# Patient Record
Sex: MALE | Race: WHITE | NOT HISPANIC OR LATINO
[De-identification: names, ages, dates, MRNs, and addresses within clinical notes are randomized per-mention and may not be internally consistent; named-entity substitution may affect disease eponyms.]

---

## 2017-01-12 ENCOUNTER — APPOINTMENT (OUTPATIENT)
Dept: FAMILY MEDICINE | Facility: CLINIC | Age: 82
End: 2017-01-12

## 2017-01-12 VITALS
TEMPERATURE: 97.8 F | BODY MASS INDEX: 29.1 KG/M2 | WEIGHT: 192 LBS | OXYGEN SATURATION: 96 % | DIASTOLIC BLOOD PRESSURE: 72 MMHG | HEIGHT: 68 IN | HEART RATE: 80 BPM | SYSTOLIC BLOOD PRESSURE: 130 MMHG | RESPIRATION RATE: 16 BRPM

## 2017-01-13 LAB
ALBUMIN SERPL ELPH-MCNC: 4.3 G/DL
ALP BLD-CCNC: 53 U/L
ALT SERPL-CCNC: 19 U/L
ANION GAP SERPL CALC-SCNC: 13 MMOL/L
AST SERPL-CCNC: 26 U/L
BASOPHILS # BLD AUTO: 0.02 K/UL
BASOPHILS NFR BLD AUTO: 0.2 %
BILIRUB SERPL-MCNC: 0.4 MG/DL
BUN SERPL-MCNC: 20 MG/DL
CALCIUM SERPL-MCNC: 9.9 MG/DL
CHLORIDE SERPL-SCNC: 102 MMOL/L
CHOLEST SERPL-MCNC: 161 MG/DL
CHOLEST/HDLC SERPL: 4.4 RATIO
CO2 SERPL-SCNC: 26 MMOL/L
CREAT SERPL-MCNC: 1.36 MG/DL
EOSINOPHIL # BLD AUTO: 0.61 K/UL
EOSINOPHIL NFR BLD AUTO: 7.4 %
GLUCOSE SERPL-MCNC: 155 MG/DL
HBA1C MFR BLD HPLC: 6.8 %
HCT VFR BLD CALC: 47.8 %
HDLC SERPL-MCNC: 37 MG/DL
HGB BLD-MCNC: 15.6 G/DL
IMM GRANULOCYTES NFR BLD AUTO: 0.4 %
LDLC SERPL CALC-MCNC: 69 MG/DL
LYMPHOCYTES # BLD AUTO: 1.73 K/UL
LYMPHOCYTES NFR BLD AUTO: 20.9 %
MAN DIFF?: NORMAL
MCHC RBC-ENTMCNC: 29.4 PG
MCHC RBC-ENTMCNC: 32.6 GM/DL
MCV RBC AUTO: 90.2 FL
MONOCYTES # BLD AUTO: 0.48 K/UL
MONOCYTES NFR BLD AUTO: 5.8 %
NEUTROPHILS # BLD AUTO: 5.41 K/UL
NEUTROPHILS NFR BLD AUTO: 65.3 %
PLATELET # BLD AUTO: 186 K/UL
POTASSIUM SERPL-SCNC: 4.3 MMOL/L
PROT SERPL-MCNC: 7.3 G/DL
RBC # BLD: 5.3 M/UL
RBC # FLD: 12.9 %
SODIUM SERPL-SCNC: 141 MMOL/L
T4 FREE SERPL-MCNC: 1.6 NG/DL
TRIGL SERPL-MCNC: 277 MG/DL
TSH SERPL-ACNC: 1.72 UIU/ML
WBC # FLD AUTO: 8.28 K/UL

## 2017-01-23 ENCOUNTER — OTHER (OUTPATIENT)
Age: 82
End: 2017-01-23

## 2017-01-23 DIAGNOSIS — R89.9 UNSPECIFIED ABNORMAL FINDING IN SPECIMENS FROM OTHER ORGANS, SYSTEMS AND TISSUES: ICD-10-CM

## 2017-01-30 ENCOUNTER — FORM ENCOUNTER (OUTPATIENT)
Age: 82
End: 2017-01-30

## 2017-01-31 ENCOUNTER — OUTPATIENT (OUTPATIENT)
Dept: OUTPATIENT SERVICES | Facility: HOSPITAL | Age: 82
LOS: 1 days | End: 2017-01-31
Payer: COMMERCIAL

## 2017-01-31 ENCOUNTER — APPOINTMENT (OUTPATIENT)
Dept: ULTRASOUND IMAGING | Facility: HOSPITAL | Age: 82
End: 2017-01-31

## 2017-01-31 DIAGNOSIS — N28.9 DISORDER OF KIDNEY AND URETER, UNSPECIFIED: ICD-10-CM

## 2017-01-31 PROCEDURE — 76775 US EXAM ABDO BACK WALL LIM: CPT

## 2017-03-14 ENCOUNTER — TRANSCRIPTION ENCOUNTER (OUTPATIENT)
Age: 82
End: 2017-03-14

## 2017-04-13 ENCOUNTER — APPOINTMENT (OUTPATIENT)
Dept: FAMILY MEDICINE | Facility: CLINIC | Age: 82
End: 2017-04-13

## 2017-04-13 VITALS
HEART RATE: 70 BPM | DIASTOLIC BLOOD PRESSURE: 78 MMHG | BODY MASS INDEX: 29.4 KG/M2 | HEIGHT: 68 IN | WEIGHT: 194 LBS | TEMPERATURE: 98 F | OXYGEN SATURATION: 92 % | SYSTOLIC BLOOD PRESSURE: 130 MMHG

## 2017-04-13 VITALS — RESPIRATION RATE: 16 BRPM

## 2017-04-13 DIAGNOSIS — R35.0 FREQUENCY OF MICTURITION: ICD-10-CM

## 2017-04-14 LAB
ALBUMIN SERPL ELPH-MCNC: 4.2 G/DL
ALP BLD-CCNC: 49 U/L
ALT SERPL-CCNC: 28 U/L
ANION GAP SERPL CALC-SCNC: 13 MMOL/L
AST SERPL-CCNC: 32 U/L
BASOPHILS # BLD AUTO: 0.03 K/UL
BASOPHILS NFR BLD AUTO: 0.4 %
BILIRUB SERPL-MCNC: 0.3 MG/DL
BUN SERPL-MCNC: 20 MG/DL
CALCIUM SERPL-MCNC: 9.5 MG/DL
CHLORIDE SERPL-SCNC: 102 MMOL/L
CHOLEST SERPL-MCNC: 170 MG/DL
CHOLEST/HDLC SERPL: 4.9 RATIO
CK SERPL-CCNC: 136 U/L
CO2 SERPL-SCNC: 26 MMOL/L
CREAT SERPL-MCNC: 1.27 MG/DL
EOSINOPHIL # BLD AUTO: 0.67 K/UL
EOSINOPHIL NFR BLD AUTO: 8.3 %
GLUCOSE SERPL-MCNC: 153 MG/DL
HBA1C MFR BLD HPLC: 7.2 %
HCT VFR BLD CALC: 47.3 %
HDLC SERPL-MCNC: 35 MG/DL
HGB BLD-MCNC: 15.5 G/DL
IMM GRANULOCYTES NFR BLD AUTO: 0.6 %
LDLC SERPL CALC-MCNC: 87 MG/DL
LYMPHOCYTES # BLD AUTO: 2.25 K/UL
LYMPHOCYTES NFR BLD AUTO: 27.9 %
MAN DIFF?: NORMAL
MCHC RBC-ENTMCNC: 29.1 PG
MCHC RBC-ENTMCNC: 32.8 GM/DL
MCV RBC AUTO: 88.7 FL
MONOCYTES # BLD AUTO: 0.45 K/UL
MONOCYTES NFR BLD AUTO: 5.6 %
NEUTROPHILS # BLD AUTO: 4.61 K/UL
NEUTROPHILS NFR BLD AUTO: 57.2 %
PLATELET # BLD AUTO: 190 K/UL
POTASSIUM SERPL-SCNC: 3.8 MMOL/L
PROT SERPL-MCNC: 7.4 G/DL
PSA SERPL-MCNC: 1.42 NG/ML
RBC # BLD: 5.33 M/UL
RBC # FLD: 12.8 %
SODIUM SERPL-SCNC: 141 MMOL/L
T4 FREE SERPL-MCNC: 1.6 NG/DL
TRIGL SERPL-MCNC: 242 MG/DL
TSH SERPL-ACNC: 1.06 UIU/ML
WBC # FLD AUTO: 8.06 K/UL

## 2017-05-23 ENCOUNTER — APPOINTMENT (OUTPATIENT)
Dept: CARDIOLOGY | Facility: CLINIC | Age: 82
End: 2017-05-23

## 2017-05-23 ENCOUNTER — NON-APPOINTMENT (OUTPATIENT)
Age: 82
End: 2017-05-23

## 2017-05-23 VITALS
HEIGHT: 68 IN | OXYGEN SATURATION: 94 % | DIASTOLIC BLOOD PRESSURE: 73 MMHG | WEIGHT: 198 LBS | HEART RATE: 58 BPM | BODY MASS INDEX: 30.01 KG/M2 | SYSTOLIC BLOOD PRESSURE: 136 MMHG | RESPIRATION RATE: 17 BRPM

## 2017-05-23 DIAGNOSIS — I49.9 CARDIAC ARRHYTHMIA, UNSPECIFIED: ICD-10-CM

## 2017-07-11 ENCOUNTER — RX RENEWAL (OUTPATIENT)
Age: 82
End: 2017-07-11

## 2017-07-12 ENCOUNTER — RX RENEWAL (OUTPATIENT)
Age: 82
End: 2017-07-12

## 2017-08-24 ENCOUNTER — APPOINTMENT (OUTPATIENT)
Dept: FAMILY MEDICINE | Facility: CLINIC | Age: 82
End: 2017-08-24
Payer: MEDICARE

## 2017-08-24 VITALS
RESPIRATION RATE: 16 BRPM | SYSTOLIC BLOOD PRESSURE: 140 MMHG | DIASTOLIC BLOOD PRESSURE: 70 MMHG | TEMPERATURE: 97.8 F | WEIGHT: 194 LBS | HEIGHT: 68 IN | HEART RATE: 60 BPM | OXYGEN SATURATION: 96 % | BODY MASS INDEX: 29.4 KG/M2

## 2017-08-24 PROCEDURE — 99214 OFFICE O/P EST MOD 30 MIN: CPT

## 2017-08-28 LAB
ALBUMIN SERPL ELPH-MCNC: 3.8 G/DL
ALP BLD-CCNC: 44 U/L
ALT SERPL-CCNC: 19 U/L
ANION GAP SERPL CALC-SCNC: 14 MMOL/L
APPEARANCE: CLEAR
AST SERPL-CCNC: 26 U/L
B BURGDOR AB SER-IMP: NEGATIVE
B BURGDOR IGM PATRN SER IB-IMP: NEGATIVE
B BURGDOR18/20KD IGM SER QL IB: NORMAL
B BURGDOR18KD IGG SER QL IB: PRESENT
B BURGDOR23KD IGG SER QL IB: NORMAL
B BURGDOR23KD IGM SER QL IB: NORMAL
B BURGDOR28KD AB SER QL IB: NORMAL
B BURGDOR28KD IGG SER QL IB: NORMAL
B BURGDOR30KD AB SER QL IB: NORMAL
B BURGDOR30KD IGG SER QL IB: NORMAL
B BURGDOR31KD IGG SER QL IB: NORMAL
B BURGDOR31KD IGM SER QL IB: NORMAL
B BURGDOR39KD IGG SER QL IB: NORMAL
B BURGDOR39KD IGM SER QL IB: PRESENT
B BURGDOR41KD IGG SER QL IB: NORMAL
B BURGDOR41KD IGM SER QL IB: NORMAL
B BURGDOR45KD AB SER QL IB: NORMAL
B BURGDOR45KD IGG SER QL IB: NORMAL
B BURGDOR58KD AB SER QL IB: NORMAL
B BURGDOR58KD IGG SER QL IB: NORMAL
B BURGDOR66KD IGG SER QL IB: NORMAL
B BURGDOR66KD IGM SER QL IB: NORMAL
B BURGDOR93KD IGG SER QL IB: NORMAL
B BURGDOR93KD IGM SER QL IB: NORMAL
BACTERIA: NEGATIVE
BASOPHILS # BLD AUTO: 0.04 K/UL
BASOPHILS NFR BLD AUTO: 0.5 %
BILIRUB SERPL-MCNC: 0.4 MG/DL
BILIRUBIN URINE: NEGATIVE
BLOOD URINE: NEGATIVE
BUN SERPL-MCNC: 23 MG/DL
CALCIUM SERPL-MCNC: 9.5 MG/DL
CHLORIDE SERPL-SCNC: 103 MMOL/L
CHOLEST SERPL-MCNC: 141 MG/DL
CHOLEST/HDLC SERPL: 4.4 RATIO
CK SERPL-CCNC: 90 U/L
CO2 SERPL-SCNC: 25 MMOL/L
COLOR: YELLOW
CREAT SERPL-MCNC: 1.39 MG/DL
CREAT SPEC-SCNC: 114 MG/DL
EOSINOPHIL # BLD AUTO: 0.94 K/UL
EOSINOPHIL NFR BLD AUTO: 11.8 %
GLUCOSE QUALITATIVE U: NORMAL MG/DL
GLUCOSE SERPL-MCNC: 132 MG/DL
HBA1C MFR BLD HPLC: 6.7 %
HCT VFR BLD CALC: 46.4 %
HDLC SERPL-MCNC: 32 MG/DL
HGB BLD-MCNC: 14.8 G/DL
IMM GRANULOCYTES NFR BLD AUTO: 0.6 %
KETONES URINE: NEGATIVE
LDLC SERPL CALC-MCNC: 82 MG/DL
LEUKOCYTE ESTERASE URINE: NEGATIVE
LYMPHOCYTES # BLD AUTO: 1.98 K/UL
LYMPHOCYTES NFR BLD AUTO: 24.8 %
MAN DIFF?: NORMAL
MCHC RBC-ENTMCNC: 28.2 PG
MCHC RBC-ENTMCNC: 31.9 GM/DL
MCV RBC AUTO: 88.5 FL
MICROALBUMIN 24H UR DL<=1MG/L-MCNC: <0.3 MG/DL
MICROALBUMIN/CREAT 24H UR-RTO: NORMAL
MICROSCOPIC-UA: NORMAL
MONOCYTES # BLD AUTO: 0.61 K/UL
MONOCYTES NFR BLD AUTO: 7.7 %
NEUTROPHILS # BLD AUTO: 4.35 K/UL
NEUTROPHILS NFR BLD AUTO: 54.6 %
NITRITE URINE: NEGATIVE
PH URINE: 5.5
PLATELET # BLD AUTO: 204 K/UL
POTASSIUM SERPL-SCNC: 4.1 MMOL/L
PROT SERPL-MCNC: 7 G/DL
PROTEIN URINE: NEGATIVE MG/DL
RBC # BLD: 5.24 M/UL
RBC # FLD: 13 %
RED BLOOD CELLS URINE: 1 /HPF
SODIUM SERPL-SCNC: 142 MMOL/L
SPECIFIC GRAVITY URINE: 1.02
SQUAMOUS EPITHELIAL CELLS: 0 /HPF
T4 FREE SERPL-MCNC: 1.4 NG/DL
TRIGL SERPL-MCNC: 135 MG/DL
TSH SERPL-ACNC: 3.67 UIU/ML
UROBILINOGEN URINE: NORMAL MG/DL
WBC # FLD AUTO: 7.97 K/UL
WHITE BLOOD CELLS URINE: 1 /HPF

## 2017-08-30 ENCOUNTER — RX RENEWAL (OUTPATIENT)
Age: 82
End: 2017-08-30

## 2017-10-19 ENCOUNTER — MEDICATION RENEWAL (OUTPATIENT)
Age: 82
End: 2017-10-19

## 2017-10-30 ENCOUNTER — APPOINTMENT (OUTPATIENT)
Dept: FAMILY MEDICINE | Facility: CLINIC | Age: 82
End: 2017-10-30

## 2017-11-15 ENCOUNTER — APPOINTMENT (OUTPATIENT)
Dept: FAMILY MEDICINE | Facility: CLINIC | Age: 82
End: 2017-11-15
Payer: MEDICARE

## 2017-11-15 ENCOUNTER — OTHER (OUTPATIENT)
Age: 82
End: 2017-11-15

## 2017-11-15 DIAGNOSIS — H91.90 UNSPECIFIED HEARING LOSS, UNSPECIFIED EAR: ICD-10-CM

## 2017-11-15 PROCEDURE — 90688 IIV4 VACCINE SPLT 0.5 ML IM: CPT

## 2017-11-15 PROCEDURE — G0008: CPT

## 2017-11-28 ENCOUNTER — APPOINTMENT (OUTPATIENT)
Dept: CARDIOLOGY | Facility: CLINIC | Age: 82
End: 2017-11-28
Payer: MEDICARE

## 2017-11-28 ENCOUNTER — NON-APPOINTMENT (OUTPATIENT)
Age: 82
End: 2017-11-28

## 2017-11-28 VITALS
WEIGHT: 196 LBS | BODY MASS INDEX: 29.7 KG/M2 | SYSTOLIC BLOOD PRESSURE: 140 MMHG | HEART RATE: 61 BPM | DIASTOLIC BLOOD PRESSURE: 83 MMHG | OXYGEN SATURATION: 95 % | RESPIRATION RATE: 16 BRPM | HEIGHT: 68 IN

## 2017-11-28 PROCEDURE — 99214 OFFICE O/P EST MOD 30 MIN: CPT

## 2017-11-28 PROCEDURE — 93000 ELECTROCARDIOGRAM COMPLETE: CPT

## 2018-02-01 ENCOUNTER — RX RENEWAL (OUTPATIENT)
Age: 83
End: 2018-02-01

## 2018-02-01 RX ORDER — MELOXICAM 15 MG/1
15 TABLET ORAL DAILY
Qty: 90 | Refills: 1 | Status: ACTIVE | COMMUNITY
Start: 2017-08-24 | End: 1900-01-01

## 2018-04-22 ENCOUNTER — RX RENEWAL (OUTPATIENT)
Age: 83
End: 2018-04-22

## 2018-05-15 ENCOUNTER — APPOINTMENT (OUTPATIENT)
Dept: CARDIOLOGY | Facility: CLINIC | Age: 83
End: 2018-05-15
Payer: MEDICARE

## 2018-05-15 ENCOUNTER — NON-APPOINTMENT (OUTPATIENT)
Age: 83
End: 2018-05-15

## 2018-05-15 VITALS
RESPIRATION RATE: 17 BRPM | BODY MASS INDEX: 30.62 KG/M2 | HEART RATE: 76 BPM | DIASTOLIC BLOOD PRESSURE: 80 MMHG | HEIGHT: 68 IN | SYSTOLIC BLOOD PRESSURE: 147 MMHG | OXYGEN SATURATION: 93 % | WEIGHT: 202 LBS

## 2018-05-15 DIAGNOSIS — R06.09 OTHER FORMS OF DYSPNEA: ICD-10-CM

## 2018-05-15 PROCEDURE — 93306 TTE W/DOPPLER COMPLETE: CPT

## 2018-05-15 PROCEDURE — 93000 ELECTROCARDIOGRAM COMPLETE: CPT

## 2018-05-15 PROCEDURE — 99215 OFFICE O/P EST HI 40 MIN: CPT

## 2018-05-16 ENCOUNTER — APPOINTMENT (OUTPATIENT)
Dept: FAMILY MEDICINE | Facility: CLINIC | Age: 83
End: 2018-05-16
Payer: MEDICARE

## 2018-05-16 ENCOUNTER — FORM ENCOUNTER (OUTPATIENT)
Age: 83
End: 2018-05-16

## 2018-05-16 VITALS
BODY MASS INDEX: 30.76 KG/M2 | OXYGEN SATURATION: 92 % | SYSTOLIC BLOOD PRESSURE: 128 MMHG | WEIGHT: 196 LBS | HEART RATE: 79 BPM | HEIGHT: 67 IN | DIASTOLIC BLOOD PRESSURE: 80 MMHG | TEMPERATURE: 98 F

## 2018-05-16 DIAGNOSIS — R26.9 UNSPECIFIED ABNORMALITIES OF GAIT AND MOBILITY: ICD-10-CM

## 2018-05-16 PROCEDURE — 36415 COLL VENOUS BLD VENIPUNCTURE: CPT

## 2018-05-16 PROCEDURE — G0439: CPT

## 2018-05-16 NOTE — HEALTH RISK ASSESSMENT
[Good] : ~his/her~  mood as  good [Two or more falls in past year] : Patient reported two or more falls in the past year [0] : 2) Feeling down, depressed, or hopeless: Not at all (0) [Patient refused colonoscopy] : Patient refused colonoscopy [Reviewed and Updated] :  reviewed and updated [None] : None [With Significant Other] : lives with significant other [Retired] : retired [High School] : high school [] :  [Feels Safe at Home] : Feels safe at home [Fully functional (bathing, dressing, toileting, transferring, walking, feeding)] : Fully functional (bathing, dressing, toileting, transferring, walking, feeding) [Fully functional (using the telephone, shopping, preparing meals, housekeeping, doing laundry, using] : Fully functional and needs no help or supervision to perform IADLs (using the telephone, shopping, preparing meals, housekeeping, doing laundry, using transportation, managing medications and managing finances) [Reports changes in hearing] : Reports changes in hearing [Smoke Detector] : smoke detector [Carbon Monoxide Detector] : carbon monoxide detector [Safety elements used in home] : safety elements used in home [Seat Belt] :  uses seat belt [Sunscreen] : uses sunscreen [Discussed at today's visit] : Advance Directives Discussed at today's visit [Patient's preferences updated] : Patient's preferences updated  [Designated Healthcare Proxy] : Designated healthcare proxy [Relationship: ___] : Relationship: [unfilled] [Aggressive treatment] : aggressive treatment [] : No [de-identified] : An occasional glass of wine [[id number]] : 0 [Change in mental status noted] : No change in mental status noted [Language] : denies difficulty with language [Behavior] : denies difficulty with behavior [Learning/Retaining New Information] : denies difficulty learning/retaining new information [Handling Complex Tasks] : denies difficulty handling complex tasks [Reasoning] : denies difficulty with reasoning [Spatial Ability and Orientation] : denies difficulty with spatial ability and orientation [Sexually Active] : not sexually active [High Risk Behavior] : no high risk behavior [Reports changes in vision] : Reports no changes in vision [Reports changes in dental health] : Reports no changes in dental health [Guns at Home] : no guns at home [Travel to Developing Areas] : does not  travel to developing areas [TB Exposure] : is not being exposed to tuberculosis [Caregiver Concerns] : does not have caregiver concerns [de-identified] : [place], Hearing aid

## 2018-05-16 NOTE — REVIEW OF SYSTEMS
[Hearing Loss] : hearing loss [Dyspnea on Exertion] : dyspnea on exertion [Negative] : Heme/Lymph [Earache] : no earache [Nosebleeds] : no nosebleeds [Hoarseness] : no hoarseness [Nasal Discharge] : no nasal discharge [Sore Throat] : no sore throat [Shortness Of Breath] : no shortness of breath [Wheezing] : no wheezing [Cough] : no cough [Headache] : no headache [Dizziness] : no dizziness [Fainting] : no fainting [Confusion] : no confusion [Memory Loss] : no memory loss

## 2018-05-16 NOTE — HISTORY OF PRESENT ILLNESS
[FreeTextEntry1] : Please see history of present illness [de-identified] : Patient is an 83-year-old gentleman, who presents today for followup. Health maintenance, physical exam patient states that he is in his usual state of health. Multiple medical problems, which include hypothyroidism, ischemic heart disease, followed closely by cardiology, hyperlipidemia, and hypertension. Patient also has a history of peripheral arterial disease. Chief complaint patient complains of occasional unsteady gait, especially with change in position while standing quickly. It doesn't happen while he is walking. It doesn't happen at rest. It only happens with quick change in position in a standing position. He states that his knees feel like they buckle .

## 2018-05-16 NOTE — ASSESSMENT
[FreeTextEntry1] : Continue present medical management, comprehensive blood work obtained, fecal occult blood. Immunology ordered. Electrocardiogram was done by cardiology yesterday. Recommend physical therapy for gait training. Neurologically, patient was 100% intact, except, of course for hearing loss

## 2018-05-16 NOTE — COUNSELING
[Weight management counseling provided] : Weight management [Healthy eating counseling provided] : healthy eating [Activity counseling provided] : activity [Behavioral health counseling provided] : behavioral health  [Sleep ___ hours/day] : Sleep [unfilled] hours/day [Engage in a relaxing activity] : Engage in a relaxing activity [Plan in advance] : Plan in advance [None] : None [Good understanding] : Patient has a good understanding of lifestyle changes and the steps needed to achieve self management goals

## 2018-05-16 NOTE — PHYSICAL EXAM
[No Acute Distress] : no acute distress [Well Nourished] : well nourished [Well Developed] : well developed [Well-Appearing] : well-appearing [Normal Sclera/Conjunctiva] : normal sclera/conjunctiva [PERRL] : pupils equal round and reactive to light [EOMI] : extraocular movements intact [Normal Outer Ear/Nose] : the outer ears and nose were normal in appearance [Normal Oropharynx] : the oropharynx was normal [No JVD] : no jugular venous distention [Supple] : supple [No Lymphadenopathy] : no lymphadenopathy [Thyroid Normal, No Nodules] : the thyroid was normal and there were no nodules present [No Respiratory Distress] : no respiratory distress  [Clear to Auscultation] : lungs were clear to auscultation bilaterally [No Accessory Muscle Use] : no accessory muscle use [Normal Rate] : normal rate  [Regular Rhythm] : with a regular rhythm [Normal S1, S2] : normal S1 and S2 [No Murmur] : no murmur heard [No Carotid Bruits] : no carotid bruits [No Abdominal Bruit] : a ~M bruit was not heard ~T in the abdomen [No Varicosities] : no varicosities [Pedal Pulses Present] : the pedal pulses are present [No Edema] : there was no peripheral edema [No Extremity Clubbing/Cyanosis] : no extremity clubbing/cyanosis [No Palpable Aorta] : no palpable aorta [Soft] : abdomen soft [Non Tender] : non-tender [Non-distended] : non-distended [No HSM] : no HSM [Normal Bowel Sounds] : normal bowel sounds [Normal Posterior Cervical Nodes] : no posterior cervical lymphadenopathy [Normal Anterior Cervical Nodes] : no anterior cervical lymphadenopathy [No CVA Tenderness] : no CVA  tenderness [No Spinal Tenderness] : no spinal tenderness [No Joint Swelling] : no joint swelling [Grossly Normal Strength/Tone] : grossly normal strength/tone [No Rash] : no rash [Normal Gait] : normal gait [Coordination Grossly Intact] : coordination grossly intact [No Focal Deficits] : no focal deficits [Deep Tendon Reflexes (DTR)] : deep tendon reflexes were 2+ and symmetric [Normal Affect] : the affect was normal [Normal Insight/Judgement] : insight and judgment were intact [Normal Voice/Communication] : normal voice/communication [Normal Appearance] : normal in appearance [No Masses] : no palpable masses [No Nipple Discharge] : no nipple discharge [No Axillary Lymphadenopathy] : no axillary lymphadenopathy [No Hernias] : no hernias [No Stool to Guaiac] : no stool to guaiac [Normal Sphincter Tone] : normal sphincter tone [No Mass] : no mass [Urethral Meatus] : meatus normal [Penis Abnormality] : normal circumcised penis [Urinary Bladder Findings] : the bladder was normal on palpation [Scrotum] : the scrotum was normal [Rectal Exam - Seminal Vesicles] : the seminal vesicles were normal [Epididymis] : the epididymides were normal [Testes Tenderness] : no tenderness of the testes [Testes Mass (___cm)] : there were no testicular masses [Anus Abnormality] : the anus and perineum were normal [Rectal Exam - Rectum] : digital rectal exam was normal [Prostate Tenderness] : the prostate was not tender [Normal Supraclavicular Nodes] : no supraclavicular lymphadenopathy [Normal Axillary Nodes] : no axillary lymphadenopathy [Normal Inguinal Nodes] : no inguinal lymphadenopathy [No Skin Lesions] : no skin lesions [Speech Grossly Normal] : speech grossly normal [Memory Grossly Normal] : memory grossly normal [Alert and Oriented x3] : oriented to person, place, and time [Normal Mood] : the mood was normal [Comprehensive Foot Exam Normal] : Right and left foot were examined and both feet are normal. No ulcers in either foot. Toes are normal and with full ROM. [Kyphosis] : no kyphosis [Scoliosis] : no scoliosis [de-identified] : [place] Hearing aid in place. Right ear [FreeTextEntry1] : Prostate enlarged

## 2018-05-17 ENCOUNTER — OUTPATIENT (OUTPATIENT)
Dept: OUTPATIENT SERVICES | Facility: HOSPITAL | Age: 83
LOS: 1 days | End: 2018-05-17
Payer: COMMERCIAL

## 2018-05-17 ENCOUNTER — APPOINTMENT (OUTPATIENT)
Dept: RADIOLOGY | Facility: HOSPITAL | Age: 83
End: 2018-05-17
Payer: MEDICARE

## 2018-05-17 DIAGNOSIS — Z13.820 ENCOUNTER FOR SCREENING FOR OSTEOPOROSIS: ICD-10-CM

## 2018-05-17 DIAGNOSIS — I49.9 CARDIAC ARRHYTHMIA, UNSPECIFIED: ICD-10-CM

## 2018-05-17 DIAGNOSIS — M85.832 OTHER SPECIFIED DISORDERS OF BONE DENSITY AND STRUCTURE, LEFT FOREARM: ICD-10-CM

## 2018-05-17 LAB
ALBUMIN SERPL ELPH-MCNC: 4.6 G/DL
ALP BLD-CCNC: 46 U/L
ALT SERPL-CCNC: 45 U/L
ANION GAP SERPL CALC-SCNC: 14 MMOL/L
APPEARANCE: CLEAR
AST SERPL-CCNC: 48 U/L
BACTERIA: NEGATIVE
BASOPHILS # BLD AUTO: 0.03 K/UL
BASOPHILS NFR BLD AUTO: 0.4 %
BILIRUB SERPL-MCNC: 0.8 MG/DL
BILIRUBIN URINE: NEGATIVE
BLOOD URINE: NEGATIVE
BUN SERPL-MCNC: 21 MG/DL
CALCIUM SERPL-MCNC: 10.2 MG/DL
CHLORIDE SERPL-SCNC: 102 MMOL/L
CHOLEST SERPL-MCNC: 194 MG/DL
CHOLEST/HDLC SERPL: 5 RATIO
CK SERPL-CCNC: 110 U/L
CO2 SERPL-SCNC: 25 MMOL/L
COLOR: YELLOW
CREAT SERPL-MCNC: 1.46 MG/DL
CREAT SPEC-SCNC: 123 MG/DL
EOSINOPHIL # BLD AUTO: 0.8 K/UL
EOSINOPHIL NFR BLD AUTO: 9.5 %
GLUCOSE QUALITATIVE U: NEGATIVE MG/DL
GLUCOSE SERPL-MCNC: 141 MG/DL
HBA1C MFR BLD HPLC: 7.1 %
HCT VFR BLD CALC: 51.2 %
HDLC SERPL-MCNC: 39 MG/DL
HGB BLD-MCNC: 16.9 G/DL
HYALINE CASTS: 0 /LPF
IMM GRANULOCYTES NFR BLD AUTO: 0.6 %
KETONES URINE: NEGATIVE
LDLC SERPL CALC-MCNC: 101 MG/DL
LEUKOCYTE ESTERASE URINE: NEGATIVE
LYMPHOCYTES # BLD AUTO: 2.14 K/UL
LYMPHOCYTES NFR BLD AUTO: 25.5 %
MAN DIFF?: NORMAL
MCHC RBC-ENTMCNC: 29.8 PG
MCHC RBC-ENTMCNC: 33 GM/DL
MCV RBC AUTO: 90.1 FL
MICROALBUMIN 24H UR DL<=1MG/L-MCNC: 3 MG/DL
MICROALBUMIN/CREAT 24H UR-RTO: 24 MG/G
MICROSCOPIC-UA: NORMAL
MONOCYTES # BLD AUTO: 0.67 K/UL
MONOCYTES NFR BLD AUTO: 8 %
NEUTROPHILS # BLD AUTO: 4.69 K/UL
NEUTROPHILS NFR BLD AUTO: 56 %
NITRITE URINE: NEGATIVE
PH URINE: 5
PLATELET # BLD AUTO: 197 K/UL
POTASSIUM SERPL-SCNC: 4.1 MMOL/L
PROT SERPL-MCNC: 7.7 G/DL
PROTEIN URINE: NEGATIVE MG/DL
PSA SERPL-MCNC: 1.52 NG/ML
RBC # BLD: 5.68 M/UL
RBC # FLD: 13 %
RED BLOOD CELLS URINE: 0 /HPF
SODIUM SERPL-SCNC: 141 MMOL/L
SPECIFIC GRAVITY URINE: 1.02
SQUAMOUS EPITHELIAL CELLS: 0 /HPF
T4 FREE SERPL-MCNC: 1.6 NG/DL
TRIGL SERPL-MCNC: 268 MG/DL
TSH SERPL-ACNC: 5.69 UIU/ML
UROBILINOGEN URINE: NEGATIVE MG/DL
WBC # FLD AUTO: 8.38 K/UL
WHITE BLOOD CELLS URINE: 1 /HPF

## 2018-05-17 PROCEDURE — 77080 DXA BONE DENSITY AXIAL: CPT | Mod: 26

## 2018-05-17 PROCEDURE — 77080 DXA BONE DENSITY AXIAL: CPT

## 2018-05-22 LAB — HEMOCCULT STL QL IA: NEGATIVE

## 2018-06-17 ENCOUNTER — EMERGENCY (EMERGENCY)
Facility: HOSPITAL | Age: 83
LOS: 1 days | Discharge: HOME | End: 2018-06-17
Attending: EMERGENCY MEDICINE | Admitting: INTERNAL MEDICINE
Payer: COMMERCIAL

## 2018-06-17 VITALS
HEIGHT: 67 IN | OXYGEN SATURATION: 94 % | RESPIRATION RATE: 17 BRPM | HEART RATE: 86 BPM | TEMPERATURE: 97 F | SYSTOLIC BLOOD PRESSURE: 118 MMHG | DIASTOLIC BLOOD PRESSURE: 82 MMHG | WEIGHT: 195.11 LBS

## 2018-06-17 DIAGNOSIS — R33.9 RETENTION OF URINE, UNSPECIFIED: ICD-10-CM

## 2018-06-17 LAB
ALBUMIN SERPL ELPH-MCNC: 2.7 G/DL — LOW (ref 3.3–5)
ALP SERPL-CCNC: 49 U/L — SIGNIFICANT CHANGE UP (ref 40–120)
ALT FLD-CCNC: 26 U/L DA — SIGNIFICANT CHANGE UP (ref 10–45)
ANION GAP SERPL CALC-SCNC: 7 MMOL/L — SIGNIFICANT CHANGE UP (ref 5–17)
APPEARANCE UR: CLEAR — SIGNIFICANT CHANGE UP
AST SERPL-CCNC: 21 U/L — SIGNIFICANT CHANGE UP (ref 10–40)
BACTERIA # UR AUTO: ABNORMAL /HPF
BASOPHILS # BLD AUTO: 0.1 K/UL — SIGNIFICANT CHANGE UP (ref 0–0.2)
BASOPHILS NFR BLD AUTO: 0.7 % — SIGNIFICANT CHANGE UP (ref 0–2)
BILIRUB SERPL-MCNC: 0.4 MG/DL — SIGNIFICANT CHANGE UP (ref 0.2–1.2)
BILIRUB UR-MCNC: NEGATIVE — SIGNIFICANT CHANGE UP
BUN SERPL-MCNC: 20 MG/DL — SIGNIFICANT CHANGE UP (ref 7–23)
CALCIUM SERPL-MCNC: 8.6 MG/DL — SIGNIFICANT CHANGE UP (ref 8.4–10.5)
CHLORIDE SERPL-SCNC: 103 MMOL/L — SIGNIFICANT CHANGE UP (ref 96–108)
CO2 SERPL-SCNC: 25 MMOL/L — SIGNIFICANT CHANGE UP (ref 22–31)
COLOR SPEC: YELLOW — SIGNIFICANT CHANGE UP
CREAT SERPL-MCNC: 1.42 MG/DL — HIGH (ref 0.5–1.3)
DIFF PNL FLD: ABNORMAL
EOSINOPHIL # BLD AUTO: 0.4 K/UL — SIGNIFICANT CHANGE UP (ref 0–0.5)
EOSINOPHIL NFR BLD AUTO: 2.8 % — SIGNIFICANT CHANGE UP (ref 0–6)
EPI CELLS # UR: SIGNIFICANT CHANGE UP
GLUCOSE SERPL-MCNC: 129 MG/DL — HIGH (ref 70–99)
GLUCOSE UR QL: NEGATIVE — SIGNIFICANT CHANGE UP
HCT VFR BLD CALC: 43.2 % — SIGNIFICANT CHANGE UP (ref 39–50)
HGB BLD-MCNC: 14.9 G/DL — SIGNIFICANT CHANGE UP (ref 13–17)
KETONES UR-MCNC: NEGATIVE — SIGNIFICANT CHANGE UP
LEUKOCYTE ESTERASE UR-ACNC: ABNORMAL
LYMPHOCYTES # BLD AUTO: 1.5 K/UL — SIGNIFICANT CHANGE UP (ref 1–3.3)
LYMPHOCYTES # BLD AUTO: 11.6 % — LOW (ref 13–44)
MCHC RBC-ENTMCNC: 29.7 PG — SIGNIFICANT CHANGE UP (ref 27–34)
MCHC RBC-ENTMCNC: 34.4 GM/DL — SIGNIFICANT CHANGE UP (ref 32–36)
MCV RBC AUTO: 86.2 FL — SIGNIFICANT CHANGE UP (ref 80–100)
MONOCYTES # BLD AUTO: 0.9 K/UL — SIGNIFICANT CHANGE UP (ref 0–0.9)
MONOCYTES NFR BLD AUTO: 7.4 % — SIGNIFICANT CHANGE UP (ref 2–14)
NEUTROPHILS # BLD AUTO: 9.8 K/UL — HIGH (ref 1.8–7.4)
NEUTROPHILS NFR BLD AUTO: 77.4 % — HIGH (ref 43–77)
NITRITE UR-MCNC: NEGATIVE — SIGNIFICANT CHANGE UP
PH UR: 5 — SIGNIFICANT CHANGE UP (ref 5–8)
PLATELET # BLD AUTO: 266 K/UL — SIGNIFICANT CHANGE UP (ref 150–400)
POTASSIUM SERPL-MCNC: 3.9 MMOL/L — SIGNIFICANT CHANGE UP (ref 3.5–5.3)
POTASSIUM SERPL-SCNC: 3.9 MMOL/L — SIGNIFICANT CHANGE UP (ref 3.5–5.3)
PROT SERPL-MCNC: 7 G/DL — SIGNIFICANT CHANGE UP (ref 6–8.3)
PROT UR-MCNC: 30 MG/DL
RBC # BLD: 5.01 M/UL — SIGNIFICANT CHANGE UP (ref 4.2–5.8)
RBC # FLD: 11.5 % — SIGNIFICANT CHANGE UP (ref 10.3–14.5)
RBC CASTS # UR COMP ASSIST: NEGATIVE /HPF — SIGNIFICANT CHANGE UP (ref 0–4)
SODIUM SERPL-SCNC: 135 MMOL/L — SIGNIFICANT CHANGE UP (ref 135–145)
SP GR SPEC: 1.02 — SIGNIFICANT CHANGE UP (ref 1.01–1.02)
UROBILINOGEN FLD QL: NEGATIVE — SIGNIFICANT CHANGE UP
WBC # BLD: 12.7 K/UL — HIGH (ref 3.8–10.5)
WBC # FLD AUTO: 12.7 K/UL — HIGH (ref 3.8–10.5)
WBC UR QL: SIGNIFICANT CHANGE UP /HPF (ref 0–5)

## 2018-06-17 PROCEDURE — 71046 X-RAY EXAM CHEST 2 VIEWS: CPT | Mod: 26

## 2018-06-17 PROCEDURE — 85027 COMPLETE CBC AUTOMATED: CPT

## 2018-06-17 PROCEDURE — 71046 X-RAY EXAM CHEST 2 VIEWS: CPT

## 2018-06-17 PROCEDURE — 80053 COMPREHEN METABOLIC PANEL: CPT

## 2018-06-17 PROCEDURE — 99284 EMERGENCY DEPT VISIT MOD MDM: CPT | Mod: 25

## 2018-06-17 PROCEDURE — 74176 CT ABD & PELVIS W/O CONTRAST: CPT

## 2018-06-17 PROCEDURE — 87086 URINE CULTURE/COLONY COUNT: CPT

## 2018-06-17 PROCEDURE — 99284 EMERGENCY DEPT VISIT MOD MDM: CPT

## 2018-06-17 PROCEDURE — 81001 URINALYSIS AUTO W/SCOPE: CPT

## 2018-06-17 PROCEDURE — 74176 CT ABD & PELVIS W/O CONTRAST: CPT | Mod: 26

## 2018-06-17 RX ORDER — GLIMEPIRIDE 1 MG
1 TABLET ORAL
Qty: 0 | Refills: 0 | COMMUNITY

## 2018-06-17 RX ORDER — MELOXICAM 15 MG/1
1 TABLET ORAL
Qty: 0 | Refills: 0 | COMMUNITY

## 2018-06-17 RX ORDER — CIPROFLOXACIN LACTATE 400MG/40ML
1 VIAL (ML) INTRAVENOUS
Qty: 14 | Refills: 0 | OUTPATIENT
Start: 2018-06-17 | End: 2018-06-23

## 2018-06-17 NOTE — ED PROVIDER NOTE - OBJECTIVE STATEMENT
83 yr old male with hx of DM, hypothyroid, CAD, HTN, HLD presents c/o productive cough for the last 3 weeks, and dysuria, urinary urgency, frequency for the last 2 days. Pt also admits to lower back pain. Denies any fever, chills, abdominal pain, chest pain, n/v/d or any other symptoms at this time.

## 2018-06-17 NOTE — ED PROVIDER NOTE - MEDICAL DECISION MAKING DETAILS
83 yr old male with hx of DM, hypothyroid, CAD, HTN, HLD presents c/o productive cough for the last 3 weeks, and dysuria, urinary urgency, frequency for the last 2 days. Pt also admits to lower back pain. : cbc, cmp, ua, uc, cxray- re eval Courtney: 83 yr old male with hx of DM, hypothyroid, CAD, HTN, HLD presents c/o productive cough for the last 3 weeks, and dysuria, urinary urgency, frequency for the last 2 days. Pt also admits to lower back pain. : cbc, cmp, ua, uc, cxray- re eval

## 2018-06-17 NOTE — ED ADULT NURSE NOTE - OBJECTIVE STATEMENT
pt presents to ED with c/o pain during urination that began friday with associated decreased urination as well.

## 2018-06-17 NOTE — ED PROVIDER NOTE - PROGRESS NOTE DETAILS
Labs and ct reviewed Will treat with cipro for prostatitis. Pt stable for discharge and to follow up with urology outpt. Pt agrees with plan.

## 2018-06-17 NOTE — ED PROVIDER NOTE - PMH
Colitis  10/2013  Diverticulitis    Gastric ulcer    GERD (gastroesophageal reflux disease)    HLD (hyperlipidemia)    HTN (hypertension)    Hypothyroidism    Renal calculi    Renal insufficiency

## 2018-06-18 LAB
CULTURE RESULTS: SIGNIFICANT CHANGE UP
SPECIMEN SOURCE: SIGNIFICANT CHANGE UP

## 2018-06-18 RX ORDER — CIPROFLOXACIN LACTATE 400MG/40ML
500 VIAL (ML) INTRAVENOUS ONCE
Qty: 0 | Refills: 0 | Status: DISCONTINUED | OUTPATIENT
Start: 2018-06-18 | End: 2018-06-18

## 2018-06-19 ENCOUNTER — APPOINTMENT (OUTPATIENT)
Dept: CARDIOLOGY | Facility: CLINIC | Age: 83
End: 2018-06-19
Payer: MEDICARE

## 2018-06-19 PROCEDURE — 93015 CV STRESS TEST SUPVJ I&R: CPT

## 2018-06-19 PROCEDURE — 78452 HT MUSCLE IMAGE SPECT MULT: CPT

## 2018-06-19 PROCEDURE — A9500: CPT

## 2018-07-09 ENCOUNTER — FORM ENCOUNTER (OUTPATIENT)
Age: 83
End: 2018-07-09

## 2018-07-09 ENCOUNTER — APPOINTMENT (OUTPATIENT)
Dept: FAMILY MEDICINE | Facility: CLINIC | Age: 83
End: 2018-07-09
Payer: MEDICARE

## 2018-07-09 VITALS
WEIGHT: 198 LBS | HEART RATE: 74 BPM | SYSTOLIC BLOOD PRESSURE: 130 MMHG | TEMPERATURE: 98 F | BODY MASS INDEX: 32.99 KG/M2 | HEIGHT: 65 IN | DIASTOLIC BLOOD PRESSURE: 70 MMHG | OXYGEN SATURATION: 95 %

## 2018-07-09 VITALS — RESPIRATION RATE: 16 BRPM

## 2018-07-09 DIAGNOSIS — N39.0 URINARY TRACT INFECTION, SITE NOT SPECIFIED: ICD-10-CM

## 2018-07-09 DIAGNOSIS — M54.5 LOW BACK PAIN: ICD-10-CM

## 2018-07-09 PROCEDURE — 36415 COLL VENOUS BLD VENIPUNCTURE: CPT

## 2018-07-09 PROCEDURE — 99214 OFFICE O/P EST MOD 30 MIN: CPT | Mod: 25

## 2018-07-09 RX ORDER — BLOOD-GLUCOSE METER
W/DEVICE EACH MISCELLANEOUS
Qty: 1 | Refills: 1 | Status: ACTIVE | COMMUNITY
Start: 2018-07-09 | End: 1900-01-01

## 2018-07-09 RX ORDER — MELOXICAM 7.5 MG/1
7.5 TABLET ORAL TWICE DAILY
Qty: 60 | Refills: 1 | Status: ACTIVE | COMMUNITY
Start: 2018-07-09 | End: 1900-01-01

## 2018-07-09 NOTE — PHYSICAL EXAM
[No Acute Distress] : no acute distress [Well Nourished] : well nourished [Well Developed] : well developed [Well-Appearing] : well-appearing [Normal Voice/Communication] : normal voice/communication [Normal Sclera/Conjunctiva] : normal sclera/conjunctiva [PERRL] : pupils equal round and reactive to light [EOMI] : extraocular movements intact [Normal Outer Ear/Nose] : the outer ears and nose were normal in appearance [Normal Oropharynx] : the oropharynx was normal [No JVD] : no jugular venous distention [Supple] : supple [No Lymphadenopathy] : no lymphadenopathy [Thyroid Normal, No Nodules] : the thyroid was normal and there were no nodules present [No Respiratory Distress] : no respiratory distress  [Clear to Auscultation] : lungs were clear to auscultation bilaterally [No Accessory Muscle Use] : no accessory muscle use [Normal Rate] : normal rate  [Regular Rhythm] : with a regular rhythm [Normal S1, S2] : normal S1 and S2 [No Murmur] : no murmur heard [No Carotid Bruits] : no carotid bruits [No Abdominal Bruit] : a ~M bruit was not heard ~T in the abdomen [No Varicosities] : no varicosities [Pedal Pulses Present] : the pedal pulses are present [No Edema] : there was no peripheral edema [No Extremity Clubbing/Cyanosis] : no extremity clubbing/cyanosis [No Palpable Aorta] : no palpable aorta [Normal Appearance] : normal in appearance [No Nipple Discharge] : no nipple discharge [No Axillary Lymphadenopathy] : no axillary lymphadenopathy [Soft] : abdomen soft [Non Tender] : non-tender [Non-distended] : non-distended [No Masses] : no abdominal mass palpated [No HSM] : no HSM [Normal Bowel Sounds] : normal bowel sounds [No Hernias] : no hernias [No Stool to Guaiac] : no stool to guaiac [Normal Sphincter Tone] : normal sphincter tone [No Mass] : no mass [Urethral Meatus] : meatus normal [Penis Abnormality] : normal circumcised penis [Urinary Bladder Findings] : the bladder was normal on palpation [Scrotum] : the scrotum was normal [Rectal Exam - Seminal Vesicles] : the seminal vesicles were normal [Epididymis] : the epididymides were normal [Testes Tenderness] : no tenderness of the testes [Testes Mass (___cm)] : there were no testicular masses [Anus Abnormality] : the anus and perineum were normal [Rectal Exam - Rectum] : digital rectal exam was normal [Prostate Tenderness] : the prostate was not tender [Normal Supraclavicular Nodes] : no supraclavicular lymphadenopathy [Normal Axillary Nodes] : no axillary lymphadenopathy [Normal Posterior Cervical Nodes] : no posterior cervical lymphadenopathy [Normal Anterior Cervical Nodes] : no anterior cervical lymphadenopathy [Normal Inguinal Nodes] : no inguinal lymphadenopathy [No CVA Tenderness] : no CVA  tenderness [No Spinal Tenderness] : no spinal tenderness [No Joint Swelling] : no joint swelling [Grossly Normal Strength/Tone] : grossly normal strength/tone [No Rash] : no rash [No Skin Lesions] : no skin lesions [Normal Gait] : normal gait [Coordination Grossly Intact] : coordination grossly intact [No Focal Deficits] : no focal deficits [Deep Tendon Reflexes (DTR)] : deep tendon reflexes were 2+ and symmetric [Speech Grossly Normal] : speech grossly normal [Memory Grossly Normal] : memory grossly normal [Normal Affect] : the affect was normal [Alert and Oriented x3] : oriented to person, place, and time [Normal Mood] : the mood was normal [Normal Insight/Judgement] : insight and judgment were intact [Kyphosis] : no kyphosis [Scoliosis] : no scoliosis [de-identified] : [place] [employer] in place. Right ear [FreeTextEntry1] : Prostate enlarged [de-identified] : Lumbosacral spasm

## 2018-07-09 NOTE — REVIEW OF SYSTEMS
[Dyspnea on Exertion] : dyspnea on exertion [Joint Pain] : joint pain [Back Pain] : back pain [Negative] : Heme/Lymph [Shortness Of Breath] : no shortness of breath [Wheezing] : no wheezing [Cough] : no cough [Joint Stiffness] : no joint stiffness [Muscle Pain] : no muscle pain [Muscle Weakness] : no muscle weakness [Joint Swelling] : no joint swelling [Headache] : no headache [Dizziness] : no dizziness [Fainting] : no fainting [Confusion] : no confusion [Memory Loss] : no memory loss

## 2018-07-09 NOTE — HEALTH RISK ASSESSMENT
[No falls in past year] : Patient reported no falls in the past year [0] : 2) Feeling down, depressed, or hopeless: Not at all (0) [] : No [de-identified] : wine Occasionally [[id number]] : 0

## 2018-07-09 NOTE — COUNSELING
[Weight management counseling provided] : Weight management [Healthy eating counseling provided] : healthy eating [Activity counseling provided] : activity [Disease Management counseling provided] : disease management  [Behavioral health counseling provided] : behavioral health  [Take medications as directed] : Take medications as directed [Check sugar ___ times per week] : Check blood sugar [unfilled]  times per week [Check feet daily] : Check feet daily [Keep FS  log to bring to next visit] : Keep finger stick log and  bring  to next visit [Check BP at home ___ x/week] : Check blood pressure at home [unfilled] times per week [Keep BP log to bring to next visit] : Keep BP log to bring to next visit [Avoid Social Isolation] : Avoid social isolation [Sleep ___ hours/day] : Sleep [unfilled] hours/day [Engage in a relaxing activity] : Engage in a relaxing activity [Plan in advance] : Plan in advance [None] : None [Good understanding] : Patient has a good understanding of lifestyle changes and the steps needed to achieve self management goals

## 2018-07-09 NOTE — ASSESSMENT
[FreeTextEntry1] : Assessment and plan:\par \par 1. Low back pain, lumbosacral sprain, attempt, meloxicam 7.5 mg twice a day, only as needed, and with food. Will obtain x-ray of lumbosacral spine rule out degenerative joint disease versus compression fracture.\par \par 2. Patient had history of hyperglycemia, most recent hemoglobin A1c 7.1. Check hemoglobin A1c. Patient has been started on glimepiride and Accu-Chek glucometer ordered.\par \par 3. Hypertension excellent blood pressure control.\par \par 4. Ischemic heart disease. Continue present medical management. Patient is asymptomatic. Chest pain-free. Continue Plavix 75 mg p.o. daily.\par \par 5. Hyperlipidemia. Continue atorvastatin 10 mg p.o. daily.

## 2018-07-09 NOTE — HISTORY OF PRESENT ILLNESS
[Coronary Artery Disease] : Coronary Artery Disease [Hyperlipidemia] : Hyperlipidemia [Hypertension] : Hypertension [Checks BP Sporadically] : The patient checks ~his/her~ blood pressure sporadically [<130/90] : Target blood pressure is  <130/90 [Target goal met] : BP target goal met [Doing Well] : Patient is doing well [Managed with medications] : managed with  medication [Moderate Intensity Therapy] : Patient is currently on moderate intensity statin  therapy [Stable] : Overall status has been stable [FreeTextEntry6] : Patient is an 83-year-old gentleman, who presents today for followup disease management visit  but in the interimm patient was seen in the emergency department, where he was diagnosed with urinary tract infection was treated with antibiotics and has since been seen by urology. Patient's chief complaint today. Low back pain, worse in the morning improves as the day progresses. Patient states that the lower back pain returns at night. Pain is improved by wearing a soft back brace.\par \par Medical history is significant for ischemic heart disease, hyperlipidemia, hypertension, and hypothyroidism. Presently, the patient is awake, alert, and oriented x3 in no acute distress, calm, cooperative, well-groomed, and nourished. Presently denies any chest pain, shortness of breath, nausea, or vomiting. He does admit to mild low back pain at this moment, but not affecting his range of motion.\par \par Patient most recently was started on glimepiride. His hemoglobin A1c was 7.1. [Symptoms Limit Activities] : Symptoms do not limit ~his/her~ activities

## 2018-07-10 ENCOUNTER — OUTPATIENT (OUTPATIENT)
Dept: OUTPATIENT SERVICES | Facility: HOSPITAL | Age: 83
LOS: 1 days | End: 2018-07-10
Payer: COMMERCIAL

## 2018-07-10 ENCOUNTER — APPOINTMENT (OUTPATIENT)
Dept: RADIOLOGY | Facility: HOSPITAL | Age: 83
End: 2018-07-10
Payer: MEDICARE

## 2018-07-10 DIAGNOSIS — M54.5 LOW BACK PAIN: ICD-10-CM

## 2018-07-10 LAB — HBA1C MFR BLD HPLC: 6.5 %

## 2018-07-10 PROCEDURE — 72110 X-RAY EXAM L-2 SPINE 4/>VWS: CPT | Mod: 26

## 2018-07-10 PROCEDURE — 72110 X-RAY EXAM L-2 SPINE 4/>VWS: CPT

## 2018-07-23 ENCOUNTER — RX RENEWAL (OUTPATIENT)
Age: 83
End: 2018-07-23

## 2018-09-18 ENCOUNTER — APPOINTMENT (OUTPATIENT)
Dept: CARDIOLOGY | Facility: CLINIC | Age: 83
End: 2018-09-18
Payer: MEDICARE

## 2018-09-18 ENCOUNTER — NON-APPOINTMENT (OUTPATIENT)
Age: 83
End: 2018-09-18

## 2018-09-18 VITALS
WEIGHT: 200 LBS | RESPIRATION RATE: 17 BRPM | OXYGEN SATURATION: 93 % | HEIGHT: 65 IN | HEART RATE: 83 BPM | SYSTOLIC BLOOD PRESSURE: 139 MMHG | DIASTOLIC BLOOD PRESSURE: 71 MMHG | BODY MASS INDEX: 33.32 KG/M2

## 2018-09-18 PROCEDURE — 93000 ELECTROCARDIOGRAM COMPLETE: CPT

## 2018-09-18 PROCEDURE — 99214 OFFICE O/P EST MOD 30 MIN: CPT

## 2018-11-12 ENCOUNTER — APPOINTMENT (OUTPATIENT)
Dept: FAMILY MEDICINE | Facility: CLINIC | Age: 83
End: 2018-11-12
Payer: MEDICARE

## 2018-11-12 DIAGNOSIS — Z23 ENCOUNTER FOR IMMUNIZATION: ICD-10-CM

## 2018-11-12 PROCEDURE — 90686 IIV4 VACC NO PRSV 0.5 ML IM: CPT

## 2018-11-12 PROCEDURE — G0008: CPT

## 2018-11-12 NOTE — REASON FOR VISIT
The patient desired injection. Under sterile conditions and following informed consent, the patient was injected with lidocaine and kenalog in their left shoulder subacromial space.   The patient tolerated the procedure well and there were no complications [FreeTextEntry1] : flu shot given in left deltoid

## 2019-01-11 ENCOUNTER — MEDICATION RENEWAL (OUTPATIENT)
Age: 84
End: 2019-01-11

## 2019-01-15 ENCOUNTER — APPOINTMENT (OUTPATIENT)
Dept: CARDIOLOGY | Facility: CLINIC | Age: 84
End: 2019-01-15
Payer: MEDICARE

## 2019-01-15 ENCOUNTER — NON-APPOINTMENT (OUTPATIENT)
Age: 84
End: 2019-01-15

## 2019-01-15 VITALS
HEART RATE: 59 BPM | WEIGHT: 210 LBS | RESPIRATION RATE: 17 BRPM | DIASTOLIC BLOOD PRESSURE: 79 MMHG | SYSTOLIC BLOOD PRESSURE: 156 MMHG | BODY MASS INDEX: 34.99 KG/M2 | OXYGEN SATURATION: 95 % | HEIGHT: 65 IN

## 2019-01-15 DIAGNOSIS — R42 DIZZINESS AND GIDDINESS: ICD-10-CM

## 2019-01-15 PROCEDURE — 99215 OFFICE O/P EST HI 40 MIN: CPT

## 2019-01-15 PROCEDURE — 93000 ELECTROCARDIOGRAM COMPLETE: CPT

## 2019-01-22 ENCOUNTER — APPOINTMENT (OUTPATIENT)
Dept: CARDIOLOGY | Facility: CLINIC | Age: 84
End: 2019-01-22
Payer: MEDICARE

## 2019-01-22 PROCEDURE — 93880 EXTRACRANIAL BILAT STUDY: CPT

## 2019-01-30 ENCOUNTER — APPOINTMENT (OUTPATIENT)
Dept: FAMILY MEDICINE | Facility: CLINIC | Age: 84
End: 2019-01-30
Payer: MEDICARE

## 2019-01-30 VITALS
HEART RATE: 72 BPM | SYSTOLIC BLOOD PRESSURE: 132 MMHG | WEIGHT: 210 LBS | TEMPERATURE: 97.5 F | BODY MASS INDEX: 34.99 KG/M2 | DIASTOLIC BLOOD PRESSURE: 70 MMHG | HEIGHT: 65 IN | OXYGEN SATURATION: 92 %

## 2019-01-30 VITALS — RESPIRATION RATE: 16 BRPM

## 2019-01-30 PROCEDURE — 99214 OFFICE O/P EST MOD 30 MIN: CPT

## 2019-01-30 NOTE — HEALTH RISK ASSESSMENT
[] : No [No falls in past year] : Patient reported no falls in the past year [0] : 2) Feeling down, depressed, or hopeless: Not at all (0) [de-identified] : social [[id number]] : 0

## 2019-01-30 NOTE — REVIEW OF SYSTEMS
[Hearing Loss] : hearing loss [Shortness Of Breath] : no shortness of breath [Wheezing] : no wheezing [Cough] : no cough [Dyspnea on Exertion] : dyspnea on exertion [Joint Pain] : joint pain [Joint Stiffness] : no joint stiffness [Muscle Pain] : no muscle pain [Muscle Weakness] : no muscle weakness [Back Pain] : back pain [Joint Swelling] : no joint swelling [Headache] : no headache [Dizziness] : no dizziness [Fainting] : no fainting [Confusion] : no confusion [Memory Loss] : no memory loss [Negative] : Heme/Lymph [FreeTextEntry4] : JIMMY

## 2019-01-30 NOTE — HISTORY OF PRESENT ILLNESS
[FreeTextEntry1] : History of present illness [de-identified] : Is an 84-year-old gentleman, who presents today for followup appointment. Medical history is significant for hypothyroidism, ischemic heart disease, presently, the patient is chest pain-free, hyperglycemia, hyperlipidemia, and peripheral arterial disease. Patient is awake, alert, and oriented x3, presently in no acute distress. He is calm, cooperative, well-groomed, and nourished. He denies any chest pain, shortness of breath, nausea, or vomiting

## 2019-01-30 NOTE — PHYSICAL EXAM
[No Acute Distress] : no acute distress [Well Nourished] : well nourished [Well Developed] : well developed [Well-Appearing] : well-appearing [Normal Voice/Communication] : normal voice/communication [Normal Sclera/Conjunctiva] : normal sclera/conjunctiva [PERRL] : pupils equal round and reactive to light [EOMI] : extraocular movements intact [Normal Outer Ear/Nose] : the outer ears and nose were normal in appearance [Normal Oropharynx] : the oropharynx was normal [Normal Nasal Mucosa] : the nasal mucosa was normal [No JVD] : no jugular venous distention [Supple] : supple [No Lymphadenopathy] : no lymphadenopathy [Thyroid Normal, No Nodules] : the thyroid was normal and there were no nodules present [No Respiratory Distress] : no respiratory distress  [Clear to Auscultation] : lungs were clear to auscultation bilaterally [No Accessory Muscle Use] : no accessory muscle use [Normal Rate] : normal rate  [Regular Rhythm] : with a regular rhythm [Normal S1, S2] : normal S1 and S2 [No Murmur] : no murmur heard [No Carotid Bruits] : no carotid bruits [No Abdominal Bruit] : a ~M bruit was not heard ~T in the abdomen [No Varicosities] : no varicosities [Pedal Pulses Present] : the pedal pulses are present [No Edema] : there was no peripheral edema [No Extremity Clubbing/Cyanosis] : no extremity clubbing/cyanosis [No Palpable Aorta] : no palpable aorta [Normal Appearance] : normal in appearance [No Nipple Discharge] : no nipple discharge [No Axillary Lymphadenopathy] : no axillary lymphadenopathy [Soft] : abdomen soft [Non Tender] : non-tender [Non-distended] : non-distended [No Masses] : no abdominal mass palpated [No HSM] : no HSM [Normal Bowel Sounds] : normal bowel sounds [No Hernias] : no hernias [No Stool to Guaiac] : no stool to guaiac [Normal Sphincter Tone] : normal sphincter tone [No Mass] : no mass [Urethral Meatus] : meatus normal [Penis Abnormality] : normal circumcised penis [Urinary Bladder Findings] : the bladder was normal on palpation [Scrotum] : the scrotum was normal [Rectal Exam - Seminal Vesicles] : the seminal vesicles were normal [Epididymis] : the epididymides were normal [Testes Tenderness] : no tenderness of the testes [Testes Mass (___cm)] : there were no testicular masses [Anus Abnormality] : the anus and perineum were normal [Rectal Exam - Rectum] : digital rectal exam was normal [Prostate Tenderness] : the prostate was not tender [Normal Supraclavicular Nodes] : no supraclavicular lymphadenopathy [Normal Axillary Nodes] : no axillary lymphadenopathy [Normal Posterior Cervical Nodes] : no posterior cervical lymphadenopathy [Normal Anterior Cervical Nodes] : no anterior cervical lymphadenopathy [Normal Inguinal Nodes] : no inguinal lymphadenopathy [No CVA Tenderness] : no CVA  tenderness [No Spinal Tenderness] : no spinal tenderness [Kyphosis] : no kyphosis [Scoliosis] : no scoliosis [No Joint Swelling] : no joint swelling [Grossly Normal Strength/Tone] : grossly normal strength/tone [No Rash] : no rash [No Skin Lesions] : no skin lesions [Normal Gait] : normal gait [Coordination Grossly Intact] : coordination grossly intact [No Focal Deficits] : no focal deficits [Deep Tendon Reflexes (DTR)] : deep tendon reflexes were 2+ and symmetric [Speech Grossly Normal] : speech grossly normal [Memory Grossly Normal] : memory grossly normal [Normal Affect] : the affect was normal [Alert and Oriented x3] : oriented to person, place, and time [Normal Mood] : the mood was normal [Normal Insight/Judgement] : insight and judgment were intact [de-identified] : JIMMY  [FreeTextEntry1] : Prostate enlarged [de-identified] : Lumbosacral spasm resolved

## 2019-01-30 NOTE — ASSESSMENT
[FreeTextEntry1] : Assessment and plan:\par \par 1. Low back pain, lumbosacral sprain resolved with conservative management\par \par 2. Patient had history of hyperglycemia, most recent hemoglobin A1c 6.5\par \par 3. Hypertension excellent blood pressure control.\par \par 4. Ischemic heart disease. Continue present medical management. Patient is asymptomatic. Chest pain-free. Plavix discontinued by cardiology\par \par 5. Hyperlipidemia. Continue atorvastatin 10 mg p.o. daily.Check comprehensive blood work check lipid profile

## 2019-02-01 LAB
ALBUMIN SERPL ELPH-MCNC: 3.9 G/DL
ALP BLD-CCNC: 43 U/L
ALT SERPL-CCNC: 36 U/L
ANION GAP SERPL CALC-SCNC: 14 MMOL/L
AST SERPL-CCNC: 37 U/L
BASOPHILS # BLD AUTO: 0.03 K/UL
BASOPHILS NFR BLD AUTO: 0.4 %
BILIRUB SERPL-MCNC: 0.4 MG/DL
BUN SERPL-MCNC: 19 MG/DL
CALCIUM SERPL-MCNC: 9.2 MG/DL
CHLORIDE SERPL-SCNC: 102 MMOL/L
CHOLEST SERPL-MCNC: 180 MG/DL
CHOLEST/HDLC SERPL: 5.5 RATIO
CO2 SERPL-SCNC: 24 MMOL/L
CREAT SERPL-MCNC: 1.45 MG/DL
EOSINOPHIL # BLD AUTO: 0.82 K/UL
EOSINOPHIL NFR BLD AUTO: 10.4 %
GLUCOSE SERPL-MCNC: 153 MG/DL
HBA1C MFR BLD HPLC: 6.1 %
HCT VFR BLD CALC: 47.9 %
HCV AB SER QL: NONREACTIVE
HCV S/CO RATIO: 0.17 S/CO
HDLC SERPL-MCNC: 33 MG/DL
HGB BLD-MCNC: 15.4 G/DL
IMM GRANULOCYTES NFR BLD AUTO: 0.4 %
LDLC SERPL CALC-MCNC: 97 MG/DL
LYMPHOCYTES # BLD AUTO: 2.17 K/UL
LYMPHOCYTES NFR BLD AUTO: 27.5 %
MAN DIFF?: NORMAL
MCHC RBC-ENTMCNC: 29.2 PG
MCHC RBC-ENTMCNC: 32.2 GM/DL
MCV RBC AUTO: 90.9 FL
MONOCYTES # BLD AUTO: 0.5 K/UL
MONOCYTES NFR BLD AUTO: 6.3 %
NEUTROPHILS # BLD AUTO: 4.34 K/UL
NEUTROPHILS NFR BLD AUTO: 55 %
PLATELET # BLD AUTO: 217 K/UL
POTASSIUM SERPL-SCNC: 4 MMOL/L
PROT SERPL-MCNC: 6.8 G/DL
PSA SERPL-MCNC: 1.43 NG/ML
RBC # BLD: 5.27 M/UL
RBC # FLD: 13.3 %
SODIUM SERPL-SCNC: 140 MMOL/L
T4 FREE SERPL-MCNC: 1.3 NG/DL
TRIGL SERPL-MCNC: 252 MG/DL
TSH SERPL-ACNC: 14.81 UIU/ML
WBC # FLD AUTO: 7.89 K/UL

## 2019-04-10 ENCOUNTER — OUTPATIENT (OUTPATIENT)
Dept: OUTPATIENT SERVICES | Facility: HOSPITAL | Age: 84
LOS: 1 days | End: 2019-04-10
Payer: COMMERCIAL

## 2019-04-10 ENCOUNTER — APPOINTMENT (OUTPATIENT)
Dept: ULTRASOUND IMAGING | Facility: HOSPITAL | Age: 84
End: 2019-04-10
Payer: MEDICARE

## 2019-04-10 DIAGNOSIS — Z00.8 ENCOUNTER FOR OTHER GENERAL EXAMINATION: ICD-10-CM

## 2019-04-10 PROCEDURE — 76536 US EXAM OF HEAD AND NECK: CPT

## 2019-04-10 PROCEDURE — 76536 US EXAM OF HEAD AND NECK: CPT | Mod: 26

## 2019-05-21 ENCOUNTER — APPOINTMENT (OUTPATIENT)
Dept: CARDIOLOGY | Facility: CLINIC | Age: 84
End: 2019-05-21
Payer: MEDICARE

## 2019-05-21 ENCOUNTER — NON-APPOINTMENT (OUTPATIENT)
Age: 84
End: 2019-05-21

## 2019-05-21 VITALS
OXYGEN SATURATION: 95 % | SYSTOLIC BLOOD PRESSURE: 133 MMHG | DIASTOLIC BLOOD PRESSURE: 74 MMHG | WEIGHT: 202 LBS | BODY MASS INDEX: 33.61 KG/M2 | HEART RATE: 79 BPM | RESPIRATION RATE: 17 BRPM

## 2019-05-21 PROCEDURE — 99214 OFFICE O/P EST MOD 30 MIN: CPT

## 2019-05-21 PROCEDURE — 93000 ELECTROCARDIOGRAM COMPLETE: CPT

## 2019-05-21 PROCEDURE — 93306 TTE W/DOPPLER COMPLETE: CPT

## 2019-05-22 ENCOUNTER — APPOINTMENT (OUTPATIENT)
Dept: FAMILY MEDICINE | Facility: CLINIC | Age: 84
End: 2019-05-22
Payer: MEDICARE

## 2019-05-22 VITALS
HEIGHT: 65 IN | WEIGHT: 199 LBS | DIASTOLIC BLOOD PRESSURE: 72 MMHG | BODY MASS INDEX: 33.15 KG/M2 | OXYGEN SATURATION: 94 % | SYSTOLIC BLOOD PRESSURE: 142 MMHG | TEMPERATURE: 97.9 F | HEART RATE: 62 BPM

## 2019-05-22 DIAGNOSIS — Z00.00 ENCOUNTER FOR GENERAL ADULT MEDICAL EXAMINATION W/OUT ABNORMAL FINDINGS: ICD-10-CM

## 2019-05-22 PROCEDURE — 36415 COLL VENOUS BLD VENIPUNCTURE: CPT

## 2019-05-22 PROCEDURE — G0439: CPT

## 2019-05-22 NOTE — PHYSICAL EXAM
[No Acute Distress] : no acute distress [Well Nourished] : well nourished [Well Developed] : well developed [Well-Appearing] : well-appearing [Normal Voice/Communication] : normal voice/communication [Normal Sclera/Conjunctiva] : normal sclera/conjunctiva [PERRL] : pupils equal round and reactive to light [EOMI] : extraocular movements intact [Normal Outer Ear/Nose] : the outer ears and nose were normal in appearance [Normal Oropharynx] : the oropharynx was normal [Normal Nasal Mucosa] : the nasal mucosa was normal [No JVD] : no jugular venous distention [Supple] : supple [No Lymphadenopathy] : no lymphadenopathy [Thyroid Normal, No Nodules] : the thyroid was normal and there were no nodules present [No Respiratory Distress] : no respiratory distress  [Clear to Auscultation] : lungs were clear to auscultation bilaterally [No Accessory Muscle Use] : no accessory muscle use [Normal Rate] : normal rate  [Regular Rhythm] : with a regular rhythm [Normal S1, S2] : normal S1 and S2 [No Murmur] : no murmur heard [No Carotid Bruits] : no carotid bruits [No Abdominal Bruit] : a ~M bruit was not heard ~T in the abdomen [No Varicosities] : no varicosities [Pedal Pulses Present] : the pedal pulses are present [No Edema] : there was no peripheral edema [No Extremity Clubbing/Cyanosis] : no extremity clubbing/cyanosis [No Palpable Aorta] : no palpable aorta [Normal Appearance] : normal in appearance [No Nipple Discharge] : no nipple discharge [No Axillary Lymphadenopathy] : no axillary lymphadenopathy [Soft] : abdomen soft [Non Tender] : non-tender [Non-distended] : non-distended [No Masses] : no abdominal mass palpated [No HSM] : no HSM [Normal Bowel Sounds] : normal bowel sounds [No Hernias] : no hernias [Normal Supraclavicular Nodes] : no supraclavicular lymphadenopathy [Normal Axillary Nodes] : no axillary lymphadenopathy [Normal Posterior Cervical Nodes] : no posterior cervical lymphadenopathy [Normal Anterior Cervical Nodes] : no anterior cervical lymphadenopathy [Normal Inguinal Nodes] : no inguinal lymphadenopathy [No CVA Tenderness] : no CVA  tenderness [No Spinal Tenderness] : no spinal tenderness [Kyphosis] : no kyphosis [Scoliosis] : no scoliosis [No Joint Swelling] : no joint swelling [Grossly Normal Strength/Tone] : grossly normal strength/tone [No Rash] : no rash [No Skin Lesions] : no skin lesions [Normal Gait] : normal gait [Coordination Grossly Intact] : coordination grossly intact [No Focal Deficits] : no focal deficits [Deep Tendon Reflexes (DTR)] : deep tendon reflexes were 2+ and symmetric [Speech Grossly Normal] : speech grossly normal [Memory Grossly Normal] : memory grossly normal [Normal Affect] : the affect was normal [Alert and Oriented x3] : oriented to person, place, and time [Normal Mood] : the mood was normal [Normal Insight/Judgement] : insight and judgment were intact [Comprehensive Foot Exam Normal] : Right and left foot were examined and both feet are normal. No ulcers in either foot. Toes are normal and with full ROM.  Normal tactile sensation with monofilament testing throughout both feet [de-identified] : JIMMY  [FreeTextEntry1] : recently done by urology [de-identified] : recently done by urology

## 2019-05-22 NOTE — REVIEW OF SYSTEMS
[Hearing Loss] : hearing loss [Shortness Of Breath] : no shortness of breath [Wheezing] : no wheezing [Cough] : no cough [Dyspnea on Exertion] : dyspnea on exertion [Joint Pain] : joint pain [Joint Stiffness] : no joint stiffness [Muscle Pain] : no muscle pain [Muscle Weakness] : no muscle weakness [Back Pain] : back pain [Joint Swelling] : no joint swelling [Headache] : no headache [Dizziness] : no dizziness [Fainting] : no fainting [Confusion] : no confusion [Unsteady Walk] : no ataxia [Memory Loss] : no memory loss [Negative] : Heme/Lymph [FreeTextEntry4] : JIMMY

## 2019-05-22 NOTE — HEALTH RISK ASSESSMENT
[Very Good] : ~his/her~  mood as very good [] : No [No falls in past year] : Patient reported no falls in the past year [0] : 2) Feeling down, depressed, or hopeless: Not at all (0) [de-identified] : social [[id number]] : 0

## 2019-05-22 NOTE — ASSESSMENT
[FreeTextEntry1] : Assessment and plan:\par \par Comprehensive health maintenance physical exam stable. No acute findings compared to previous exam.\par \par 1. Hypertension excellent blood pressure control. Continue quinapril 10 mg p.o. daily.\par \par 2. Hyperlipidemia. Continue atorvastatin 10 mg at bedtime.\par \par 3. Hyperglycemia. Continue glimepiride 2 mg p.o. daily. Check hemoglobin A1c and urine analysis.\par \par 4. Hypothyroidism. Continue levothyroxine 200 mcg check TSH and free T4.\par \par 5. BPH. Recently patient was seen by urology. Continue tamsulosin 0.4 mg one capsule daily.

## 2019-05-22 NOTE — HISTORY OF PRESENT ILLNESS
[FreeTextEntry1] : Comprehensive health maintenance, physical exam [de-identified] : Patient is we will gentleman, who presents today for followup comprehensive health maintenance physical exam. Medical history is significant for hypothyroidism. His last TSH was good and 14. I will obtain comprehensive blood work at today's visit. Patient also has history of ischemic heart disease. He is doing extremely well. Recently seen by cardiology. Echocardiogram electrocardiogram, and recently carotid ultrasound were done. Medical history is also significant for hyperglycemia, hyperlipidemia, and peripheral arterial disease.\par \par The patient is awake, alert, and oriented x3 in no acute distress. He is calm, cooperative, well-groomed, and nourished.

## 2019-05-22 NOTE — COUNSELING
[Weight management counseling provided] : Weight management [Healthy eating counseling provided] : healthy eating [Activity counseling provided] : activity [Behavioral health counseling provided] : behavioral health  [Fall prevention counseling provided] : fall prevention  [Sleep ___ hours/day] : Sleep [unfilled] hours/day [Engage in a relaxing activity] : Engage in a relaxing activity [Plan in advance] : Plan in advance [Adequate lighting] : Adequate lighting [No throw rugs] : No throw rugs [Use proper foot wear] : Use proper foot wear [Use recommended devices] : Use recommended devices [None] : None [Good understanding] : Patient has a good understanding of lifestyle changes and the steps needed to achieve self management goals

## 2019-05-23 LAB
ALBUMIN SERPL ELPH-MCNC: 4.3 G/DL
ALP BLD-CCNC: 42 U/L
ALT SERPL-CCNC: 28 U/L
ANION GAP SERPL CALC-SCNC: 11 MMOL/L
APPEARANCE: CLEAR
AST SERPL-CCNC: 30 U/L
BACTERIA: NEGATIVE
BASOPHILS # BLD AUTO: 0.04 K/UL
BASOPHILS NFR BLD AUTO: 0.5 %
BILIRUB SERPL-MCNC: 0.4 MG/DL
BILIRUBIN URINE: NEGATIVE
BLOOD URINE: NEGATIVE
BUN SERPL-MCNC: 18 MG/DL
CALCIUM SERPL-MCNC: 9.8 MG/DL
CHLORIDE SERPL-SCNC: 103 MMOL/L
CO2 SERPL-SCNC: 26 MMOL/L
COLOR: YELLOW
CREAT SERPL-MCNC: 1.41 MG/DL
CREAT SPEC-SCNC: 119 MG/DL
EOSINOPHIL # BLD AUTO: 0.66 K/UL
EOSINOPHIL NFR BLD AUTO: 7.8 %
ESTIMATED AVERAGE GLUCOSE: 128 MG/DL
GLUCOSE QUALITATIVE U: NEGATIVE
GLUCOSE SERPL-MCNC: 77 MG/DL
HBA1C MFR BLD HPLC: 6.1 %
HCT VFR BLD CALC: 50.8 %
HGB BLD-MCNC: 15.7 G/DL
HYALINE CASTS: 1 /LPF
IMM GRANULOCYTES NFR BLD AUTO: 0.7 %
KETONES URINE: NEGATIVE
LEUKOCYTE ESTERASE URINE: NEGATIVE
LYMPHOCYTES # BLD AUTO: 2.06 K/UL
LYMPHOCYTES NFR BLD AUTO: 24.3 %
MAN DIFF?: NORMAL
MCHC RBC-ENTMCNC: 28.6 PG
MCHC RBC-ENTMCNC: 30.9 GM/DL
MCV RBC AUTO: 92.5 FL
MICROALBUMIN 24H UR DL<=1MG/L-MCNC: <1.2 MG/DL
MICROALBUMIN/CREAT 24H UR-RTO: NORMAL MG/G
MICROSCOPIC-UA: NORMAL
MONOCYTES # BLD AUTO: 0.69 K/UL
MONOCYTES NFR BLD AUTO: 8.1 %
NEUTROPHILS # BLD AUTO: 4.97 K/UL
NEUTROPHILS NFR BLD AUTO: 58.6 %
NITRITE URINE: NEGATIVE
PH URINE: 5
PLATELET # BLD AUTO: 217 K/UL
POTASSIUM SERPL-SCNC: 4.1 MMOL/L
PROT SERPL-MCNC: 7.1 G/DL
PROTEIN URINE: NEGATIVE
PSA SERPL-MCNC: 2.27 NG/ML
RBC # BLD: 5.49 M/UL
RBC # FLD: 12.9 %
RED BLOOD CELLS URINE: 0 /HPF
SODIUM SERPL-SCNC: 140 MMOL/L
SPECIFIC GRAVITY URINE: 1.02
SQUAMOUS EPITHELIAL CELLS: 0 /HPF
T4 FREE SERPL-MCNC: 1.7 NG/DL
TSH SERPL-ACNC: 2.93 UIU/ML
UROBILINOGEN URINE: NORMAL
WBC # FLD AUTO: 8.48 K/UL
WHITE BLOOD CELLS URINE: 1 /HPF

## 2019-08-14 ENCOUNTER — RX RENEWAL (OUTPATIENT)
Age: 84
End: 2019-08-14

## 2019-09-06 ENCOUNTER — MEDICATION RENEWAL (OUTPATIENT)
Age: 84
End: 2019-09-06

## 2019-09-17 ENCOUNTER — NON-APPOINTMENT (OUTPATIENT)
Age: 84
End: 2019-09-17

## 2019-09-17 ENCOUNTER — APPOINTMENT (OUTPATIENT)
Dept: CARDIOLOGY | Facility: CLINIC | Age: 84
End: 2019-09-17
Payer: MEDICARE

## 2019-09-17 ENCOUNTER — APPOINTMENT (OUTPATIENT)
Dept: CARDIOLOGY | Facility: CLINIC | Age: 84
End: 2019-09-17

## 2019-09-17 VITALS
WEIGHT: 194 LBS | RESPIRATION RATE: 17 BRPM | OXYGEN SATURATION: 94 % | SYSTOLIC BLOOD PRESSURE: 145 MMHG | HEART RATE: 64 BPM | DIASTOLIC BLOOD PRESSURE: 79 MMHG | BODY MASS INDEX: 32.32 KG/M2 | HEIGHT: 65 IN

## 2019-09-17 PROCEDURE — 99214 OFFICE O/P EST MOD 30 MIN: CPT

## 2019-09-17 PROCEDURE — 93000 ELECTROCARDIOGRAM COMPLETE: CPT

## 2019-09-25 ENCOUNTER — APPOINTMENT (OUTPATIENT)
Dept: FAMILY MEDICINE | Facility: CLINIC | Age: 84
End: 2019-09-25
Payer: MEDICARE

## 2019-09-25 VITALS
BODY MASS INDEX: 32.15 KG/M2 | OXYGEN SATURATION: 92 % | HEART RATE: 71 BPM | DIASTOLIC BLOOD PRESSURE: 74 MMHG | TEMPERATURE: 97.9 F | HEIGHT: 65 IN | WEIGHT: 193 LBS | SYSTOLIC BLOOD PRESSURE: 134 MMHG

## 2019-09-25 PROCEDURE — 99214 OFFICE O/P EST MOD 30 MIN: CPT

## 2019-09-25 RX ORDER — TAMSULOSIN HYDROCHLORIDE 0.4 MG/1
0.4 CAPSULE ORAL
Qty: 90 | Refills: 3 | Status: DISCONTINUED | COMMUNITY
Start: 2017-04-13 | End: 2019-09-25

## 2019-09-25 NOTE — HEALTH RISK ASSESSMENT
[] : No [No] : In the past 12 months have you used drugs other than those required for medical reasons? No [No falls in past year] : Patient reported no falls in the past year [0] : 2) Feeling down, depressed, or hopeless: Not at all (0) [Audit-CScore] : 0 [[id number]] : 0

## 2019-09-25 NOTE — PHYSICAL EXAM
[No Acute Distress] : no acute distress [Well Nourished] : well nourished [Well Developed] : well developed [Well-Appearing] : well-appearing [Normal Voice/Communication] : normal voice/communication [Normal Sclera/Conjunctiva] : normal sclera/conjunctiva [PERRL] : pupils equal round and reactive to light [EOMI] : extraocular movements intact [Normal Outer Ear/Nose] : the outer ears and nose were normal in appearance [No JVD] : no jugular venous distention [Normal Oropharynx] : the oropharynx was normal [No Lymphadenopathy] : no lymphadenopathy [Supple] : supple [Thyroid Normal, No Nodules] : the thyroid was normal and there were no nodules present [No Respiratory Distress] : no respiratory distress  [Clear to Auscultation] : lungs were clear to auscultation bilaterally [No Accessory Muscle Use] : no accessory muscle use [Normal Rate] : normal rate  [Regular Rhythm] : with a regular rhythm [Normal S1, S2] : normal S1 and S2 [No Murmur] : no murmur heard [No Abdominal Bruit] : a ~M bruit was not heard ~T in the abdomen [No Carotid Bruits] : no carotid bruits [No Varicosities] : no varicosities [Pedal Pulses Present] : the pedal pulses are present [No Edema] : there was no peripheral edema [No Palpable Aorta] : no palpable aorta [No Extremity Clubbing/Cyanosis] : no extremity clubbing/cyanosis [Soft] : abdomen soft [Non Tender] : non-tender [Non-distended] : non-distended [No Masses] : no abdominal mass palpated [No HSM] : no HSM [Normal Bowel Sounds] : normal bowel sounds [Normal Posterior Cervical Nodes] : no posterior cervical lymphadenopathy [Normal Anterior Cervical Nodes] : no anterior cervical lymphadenopathy [No CVA Tenderness] : no CVA  tenderness [No Spinal Tenderness] : no spinal tenderness [No Joint Swelling] : no joint swelling [Grossly Normal Strength/Tone] : grossly normal strength/tone [Coordination Grossly Intact] : coordination grossly intact [No Rash] : no rash [No Focal Deficits] : no focal deficits [Normal Gait] : normal gait [Deep Tendon Reflexes (DTR)] : deep tendon reflexes were 2+ and symmetric [Speech Grossly Normal] : speech grossly normal [Memory Grossly Normal] : memory grossly normal [Normal Affect] : the affect was normal [Alert and Oriented x3] : oriented to person, place, and time [Normal Mood] : the mood was normal [Normal Insight/Judgement] : insight and judgment were intact [de-identified] : [place] [facility] [Comprehensive Foot Exam Normal] : Right and left foot were examined and both feet are normal. No ulcers in either foot. Toes are normal and with full ROM.  Normal tactile sensation with monofilament testing throughout both feet

## 2019-09-25 NOTE — ASSESSMENT
[FreeTextEntry1] : Assessment and plan:\par \par 1. Ischemic heart disease. Patient is chest pain-free. Recently seen by cardiology. No change in medications.\par \par 2. Hypertension excellent blood pressure control. Continue quinapril 10 mg p.o. daily.\par \par 3. Elevated hemoglobin A1c well controlled with Amaryl 2 mg p.o. daily. Most recent hemoglobin A1c 6.1, and patient is following. No concentrated sweets, consistent carbohydrate diet.\par \par 4. Hypothyroidism. Continue levothyroxine 200 mcg p.o. daily, and next visit. I will obtain comprehensive blood work.\par \par 5. Hyperlipidemia. Continue atorvastatin 10 mg p.o. daily at that\par \par 6. Health maintenance issues discussed with patient. He recently had influenza vaccine given at his [facility]

## 2019-09-25 NOTE — HISTORY OF PRESENT ILLNESS
[Coronary Artery Disease] : Coronary Artery Disease [Diabetes Mellitus] : Diabetes Mellitus [Hyperlipidemia] : Hyperlipidemia [Hypertension] : Hypertension [FreeTextEntry6] : Patient is an 84-year-old who presents today for followup of disease. We will be addressing hypothyroidism, ischemic heart disease. Patient recently seen by cardiology and was given a clean bill of hyperglycemia, hyperlipidemia, hypertension, and history of peripheral arterial disease. Patient does admit to feeling better and walking, better. It appears that D. peripheral arterial disease is well controlled.\par \par [facility] Paperwork was brought in.\par \par Patient is awake, alert, and oriented x3 in no acute distress. He is calm, cooperative, well-groomed, and he presently denies any chest pain, shortness of breath, nausea, or vomiting. [No episodes] : No hypoglycemic episodes since the last visit. [Does not check] : Patient does not check blood glucose regularly [Regular podiatrist visits] : Patient did not have regular podiatrist visit [Understanding of foot care] : Patient expressed understanding of foot care [No Retinopathy] : No retinopathy [Most Recent A1C: ___] : Most recent A1C was [unfilled] [Moderate Intensity] : Patient is currently on moderate intensity statin  therapy [EyeExamDate] : 05/19 [Checks BP Sporadically] : The patient checks ~his/her~ blood pressure sporadically [<130/90] : Target blood pressure is  <130/90 [Target goal met] : BP target goal met [Doing Well] : Patient is doing well [Managed with medications] : managed with  medication [Moderate Intensity Therapy] : Patient is currently on moderate intensity statin  therapy [Symptoms Limit Activities] : Symptoms do not limit ~his/her~ activities [Stable] : Overall status has been stable

## 2019-09-25 NOTE — REVIEW OF SYSTEMS
[Hearing Loss] : hearing loss [Shortness Of Breath] : no shortness of breath [Wheezing] : no wheezing [Cough] : no cough [Dyspnea on Exertion] : dyspnea on exertion [Joint Pain] : joint pain [Muscle Pain] : no muscle pain [Joint Stiffness] : no joint stiffness [Muscle Weakness] : no muscle weakness [Back Pain] : back pain [Joint Swelling] : no joint swelling [Headache] : no headache [Dizziness] : no dizziness [Fainting] : no fainting [Confusion] : no confusion [Unsteady Walk] : no ataxia [Memory Loss] : no memory loss [Negative] : Heme/Lymph [FreeTextEntry4] : JIMMY

## 2019-11-21 ENCOUNTER — OTHER (OUTPATIENT)
Age: 84
End: 2019-11-21

## 2020-01-07 ENCOUNTER — APPOINTMENT (OUTPATIENT)
Dept: CARDIOLOGY | Facility: CLINIC | Age: 85
End: 2020-01-07
Payer: MEDICARE

## 2020-01-07 ENCOUNTER — NON-APPOINTMENT (OUTPATIENT)
Age: 85
End: 2020-01-07

## 2020-01-07 VITALS
RESPIRATION RATE: 17 BRPM | BODY MASS INDEX: 31.99 KG/M2 | OXYGEN SATURATION: 95 % | HEIGHT: 65 IN | SYSTOLIC BLOOD PRESSURE: 125 MMHG | WEIGHT: 192 LBS | DIASTOLIC BLOOD PRESSURE: 74 MMHG | HEART RATE: 65 BPM

## 2020-01-07 PROCEDURE — 99214 OFFICE O/P EST MOD 30 MIN: CPT

## 2020-01-07 PROCEDURE — 93000 ELECTROCARDIOGRAM COMPLETE: CPT

## 2020-01-22 ENCOUNTER — APPOINTMENT (OUTPATIENT)
Dept: FAMILY MEDICINE | Facility: CLINIC | Age: 85
End: 2020-01-22
Payer: MEDICARE

## 2020-01-22 VITALS
SYSTOLIC BLOOD PRESSURE: 138 MMHG | HEART RATE: 69 BPM | TEMPERATURE: 98.3 F | WEIGHT: 189 LBS | DIASTOLIC BLOOD PRESSURE: 72 MMHG | BODY MASS INDEX: 31.49 KG/M2 | OXYGEN SATURATION: 95 % | HEIGHT: 65 IN

## 2020-01-22 PROCEDURE — 90732 PPSV23 VACC 2 YRS+ SUBQ/IM: CPT

## 2020-01-22 PROCEDURE — 99214 OFFICE O/P EST MOD 30 MIN: CPT | Mod: 25

## 2020-01-22 PROCEDURE — G0009: CPT

## 2020-01-22 NOTE — HISTORY OF PRESENT ILLNESS
[Diabetes Mellitus] : Diabetes Mellitus [Coronary Artery Disease] : Coronary Artery Disease [Obesity] : Obesity [Hypertension] : Hypertension [Hyperlipidemia] : Hyperlipidemia [FreeTextEntry6] : Patient is an 85-year-old gentleman, who presents today for followup and disease management. Medical history significant for coronary artery disease, hyperglycemia, hyperlipidemia, hypertension, and degenerative joint disease. Patient states that he is in his usual state of health. Patient recently seen by cardiology. States, that he's feeling well. He denies any chest pain or shortness of breath at this time. [Understanding of foot care] : Patient expressed understanding of foot care [Does not check] : Patient does not check blood glucose regularly [Most Recent A1C: ___] : Most recent A1C was [unfilled] [No Retinopathy] : No retinopathy [Moderate Intensity] : Patient is currently on moderate intensity statin  therapy [EyeExamDate] : 06/2019 [de-identified] : More than diabetes mellitus type 2. Patient has glucose intolerance. His most recent hemoglobin A1c was 6.1, good control . [Checks BP Sporadically] : The patient checks ~his/her~ blood pressure sporadically [<130/90] : Target blood pressure is  <130/90 [Target goal met] : BP target goal met [Doing Well] : Patient is doing well [Moderate Intensity Therapy] : Patient is currently on moderate intensity statin  therapy [Managed with medications] : managed with  medication [Symptoms Limit Activities] : Symptoms do not limit ~his/her~ activities [Stable] : Overall status has been stable [Weight Loss ___ Pounds] : Weight loss of [unfilled] pounds [Following Diet Successfully] : Following the diet successfully [___ times per week] : Patient exercises [unfilled] times per week [Following  treatment as advised] : Patient is following  treatment as advised [Action] : Action: patient is following a plan to lose weight

## 2020-01-22 NOTE — PHYSICAL EXAM
[No Acute Distress] : no acute distress [Well Developed] : well developed [Well Nourished] : well nourished [Well-Appearing] : well-appearing [PERRL] : pupils equal round and reactive to light [Normal Sclera/Conjunctiva] : normal sclera/conjunctiva [EOMI] : extraocular movements intact [Normal Outer Ear/Nose] : the outer ears and nose were normal in appearance [Normal Oropharynx] : the oropharynx was normal [No JVD] : no jugular venous distention [No Lymphadenopathy] : no lymphadenopathy [Supple] : supple [Thyroid Normal, No Nodules] : the thyroid was normal and there were no nodules present [No Respiratory Distress] : no respiratory distress  [No Accessory Muscle Use] : no accessory muscle use [Clear to Auscultation] : lungs were clear to auscultation bilaterally [Normal Rate] : normal rate  [Regular Rhythm] : with a regular rhythm [No Murmur] : no murmur heard [Normal S1, S2] : normal S1 and S2 [No Carotid Bruits] : no carotid bruits [No Abdominal Bruit] : a ~M bruit was not heard ~T in the abdomen [No Varicosities] : no varicosities [Pedal Pulses Present] : the pedal pulses are present [No Edema] : there was no peripheral edema [No Palpable Aorta] : no palpable aorta [No Extremity Clubbing/Cyanosis] : no extremity clubbing/cyanosis [Soft] : abdomen soft [Non Tender] : non-tender [Non-distended] : non-distended [No Masses] : no abdominal mass palpated [No HSM] : no HSM [Normal Bowel Sounds] : normal bowel sounds [Normal Posterior Cervical Nodes] : no posterior cervical lymphadenopathy [Normal Anterior Cervical Nodes] : no anterior cervical lymphadenopathy [No CVA Tenderness] : no CVA  tenderness [No Spinal Tenderness] : no spinal tenderness [No Joint Swelling] : no joint swelling [Grossly Normal Strength/Tone] : grossly normal strength/tone [No Rash] : no rash [Coordination Grossly Intact] : coordination grossly intact [No Focal Deficits] : no focal deficits [Normal Gait] : normal gait [Deep Tendon Reflexes (DTR)] : deep tendon reflexes were 2+ and symmetric [Normal Affect] : the affect was normal [Normal Insight/Judgement] : insight and judgment were intact [de-identified] : [employer] right ear

## 2020-01-22 NOTE — REVIEW OF SYSTEMS
[Earache] : no earache [Hearing Loss] : hearing loss [Nosebleeds] : no nosebleeds [Postnasal Drip] : no postnasal drip [Nasal Discharge] : no nasal discharge [Sore Throat] : no sore throat [Hoarseness] : no hoarseness [Negative] : Heme/Lymph

## 2020-01-22 NOTE — COUNSELING

## 2020-01-22 NOTE — ASSESSMENT
[FreeTextEntry1] : Assessment and plan:\par \par 1. Diabetes mellitus, type II non-insulin requiring, more glucose intolerance. Patient is taking glimepiride 2 mg daily. His most recent hemoglobin A1c 6.1. Continue present medical management, and no concentrated sweets, consistent carbohydrate diet.\par \par 2. Ischemic heart disease. Patient is chest pain-free. Recently seen by cardiology and he was given a clean bill of health. He will continue aspirin 81 mg p.o. daily.\par \par 3. Hypertension excellent blood pressure control. Patient is on a low dose ACE inhibitor quinapril 10 mg p.o. daily, and he will follow lifestyle changes.\par \par 4. Hypercholesterolemia/hyperlipidemia. Patient is tolerating both atorvastatin 10 mg p.o. daily, and fenofibrate 160 mg daily with low fat, low cholesterol diet.\par \par 5. Hypothyroidism. Patient taken levothyroxin 200 mcg p.o. daily.\par \par 6. Degenerative joint disease. Patient occasionally takes meloxicam.\par \par 7. Discussed with patient. Health maintenance issues Pneumovax 23 was administered at this visit by examiner.\par \par 8. At followup appointment. I will obtain comprehensive blood work. No change in medical management

## 2020-01-22 NOTE — HEALTH RISK ASSESSMENT
[] : No [No] : In the past 12 months have you used drugs other than those required for medical reasons? No [No falls in past year] : Patient reported no falls in the past year [0] : 2) Feeling down, depressed, or hopeless: Not at all (0) [Audit-CScore] : 0 [[id number]] : 0

## 2020-01-31 ENCOUNTER — RX RENEWAL (OUTPATIENT)
Age: 85
End: 2020-01-31

## 2020-05-18 ENCOUNTER — APPOINTMENT (OUTPATIENT)
Dept: CARDIOLOGY | Facility: CLINIC | Age: 85
End: 2020-05-18
Payer: MEDICARE

## 2020-05-18 ENCOUNTER — NON-APPOINTMENT (OUTPATIENT)
Age: 85
End: 2020-05-18

## 2020-05-18 VITALS
RESPIRATION RATE: 17 BRPM | DIASTOLIC BLOOD PRESSURE: 73 MMHG | HEIGHT: 65 IN | HEART RATE: 60 BPM | WEIGHT: 190 LBS | OXYGEN SATURATION: 94 % | SYSTOLIC BLOOD PRESSURE: 124 MMHG | BODY MASS INDEX: 31.65 KG/M2

## 2020-05-18 DIAGNOSIS — E78.5 HYPERLIPIDEMIA, UNSPECIFIED: ICD-10-CM

## 2020-05-18 DIAGNOSIS — I73.9 PERIPHERAL VASCULAR DISEASE, UNSPECIFIED: ICD-10-CM

## 2020-05-18 PROCEDURE — 93306 TTE W/DOPPLER COMPLETE: CPT

## 2020-05-18 PROCEDURE — 93000 ELECTROCARDIOGRAM COMPLETE: CPT

## 2020-05-18 PROCEDURE — 99214 OFFICE O/P EST MOD 30 MIN: CPT

## 2020-05-20 ENCOUNTER — APPOINTMENT (OUTPATIENT)
Dept: FAMILY MEDICINE | Facility: CLINIC | Age: 85
End: 2020-05-20
Payer: MEDICARE

## 2020-05-20 VITALS
DIASTOLIC BLOOD PRESSURE: 72 MMHG | TEMPERATURE: 97.8 F | HEIGHT: 65 IN | WEIGHT: 190 LBS | BODY MASS INDEX: 31.65 KG/M2 | SYSTOLIC BLOOD PRESSURE: 126 MMHG | HEART RATE: 57 BPM | OXYGEN SATURATION: 95 %

## 2020-05-20 DIAGNOSIS — E03.9 HYPOTHYROIDISM, UNSPECIFIED: ICD-10-CM

## 2020-05-20 DIAGNOSIS — I10 ESSENTIAL (PRIMARY) HYPERTENSION: ICD-10-CM

## 2020-05-20 DIAGNOSIS — R73.9 HYPERGLYCEMIA, UNSPECIFIED: ICD-10-CM

## 2020-05-20 DIAGNOSIS — M19.90 UNSPECIFIED OSTEOARTHRITIS, UNSPECIFIED SITE: ICD-10-CM

## 2020-05-20 DIAGNOSIS — I25.10 ATHEROSCLEROTIC HEART DISEASE OF NATIVE CORONARY ARTERY W/OUT ANGINA PECTORIS: ICD-10-CM

## 2020-05-20 DIAGNOSIS — E78.5 HYPERLIPIDEMIA, UNSPECIFIED: ICD-10-CM

## 2020-05-20 PROCEDURE — 36415 COLL VENOUS BLD VENIPUNCTURE: CPT

## 2020-05-20 PROCEDURE — 99215 OFFICE O/P EST HI 40 MIN: CPT | Mod: 25

## 2020-05-20 NOTE — HEALTH RISK ASSESSMENT
[] : No [Monthly or less (1 pt)] : Monthly or less (1 point) [Yes] : Yes [1 or 2 (0 pts)] : 1 or 2 (0 points) [Never (0 pts)] : Never (0 points) [No] : In the past 12 months have you used drugs other than those required for medical reasons? No [No falls in past year] : Patient reported no falls in the past year [Audit-CScore] : 1 [0] : 2) Feeling down, depressed, or hopeless: Not at all (0) [[id number]] : 0

## 2020-05-20 NOTE — HISTORY OF PRESENT ILLNESS
[FreeTextEntry1] :  see HPI [de-identified] : Bernabe is an 85-year-old gentleman who presents today for follow-up and disease management presently the patient is awake alert and oriented x3 in no acute distress recently was seen by cardiology echocardiogram was done which I will review with patient at today's visit.\par \par Medical history is significant for ischemic heart disease presently the patient is chest pain-free degenerative joint disease unsteady gait still persists hyperglycemia diet controlled hyperlipidemia and hypertension.\par \par Patient is awake alert and oriented x3 in no acute distress he is calm and cooperative denies any chest pain shortness of breath but does admit to occasional dyspnea with exertion.

## 2020-05-20 NOTE — ASSESSMENT
[FreeTextEntry1] : Assessment and plan:\par 1.  Ischemic heart disease well controlled patient recently seen by cardiology I reviewed with patient most recent cardiology visit and also echocardiogram.\par \par 2.  Hyperglycemia/diabetes mellitus type 2 non-insulin-requiring continue home blood sugar monitoring, detailed discussion with patient regarding diet no concentrated sweets consistent carbohydrates, glimepiride 2 mg p.o. daily.\par \par 3.  Hyperlipidemia/hypercholesterolemia again had a detailed discussion regarding low-fat low-cholesterol diet, increase physical activity as tolerated continue atorvastatin 10 mg p.o. at bedtime and fenofibrate 160 mg p.o. daily.\par \par 4.  Hypothyroidism check TSH and free T4 continue levothyroxine 200 mcg p.o. daily.\par \par 5.  Hypertension patient's blood pressure is under good control today's blood pressure 126/72 patient will continue quinapril 10 mg p.o. daily and lifestyle changes discussed with patient low-fat, low-cholesterol, no concentrated sweets consistent carbohydrate and low-sodium diet.\par \par 6.  Degenerative joint disease continue meloxicam only as needed patient takes medications quite sparingly.\par \par 7.  BMI is slightly elevated detailed discussion with patient regarding weight loss program.\par \par 8.  Patient will continue present medical management, comprehensive blood work drawn in office, reviewed with patient all health maintenance issues patient will be moving down to [place] this coming July or August discussed with patient what he needs to do before moving to [place] and what he needs to do medically speaking when he gets to [place].\par \par 9.  Face-to-face with patient 40 minutes greater than 50% of that time was spent on counseling and coordination of care regarding diabetes mellitus type 2 and ischemic heart disease.\par \par

## 2020-05-20 NOTE — COUNSELING
[Fall prevention counseling provided] : Fall prevention counseling provided [Adequate lighting] : Adequate lighting [No throw rugs] : No throw rugs [Use proper foot wear] : Use proper foot wear [Use recommended devices] : Use recommended devices [Behavioral health counseling provided] : Behavioral health counseling provided [Sleep ___ hours/day] : Sleep [unfilled] hours/day [Engage in a relaxing activity] : Engage in a relaxing activity [Plan in advance] : Plan in advance [AUDIT-C Screening administered and reviewed] : AUDIT-C Screening administered and reviewed [None] : None [Good understanding] : Patient has a good understanding of lifestyle changes and steps needed to achieve self management goal

## 2020-05-20 NOTE — REVIEW OF SYSTEMS
[Earache] : no earache [Hearing Loss] : hearing loss [Postnasal Drip] : no postnasal drip [Nosebleeds] : no nosebleeds [Nasal Discharge] : no nasal discharge [Hoarseness] : no hoarseness [Sore Throat] : no sore throat [Negative] : Heme/Lymph

## 2020-05-20 NOTE — PHYSICAL EXAM
[Well Nourished] : well nourished [No Acute Distress] : no acute distress [Well Developed] : well developed [Normal Sclera/Conjunctiva] : normal sclera/conjunctiva [Well-Appearing] : well-appearing [PERRL] : pupils equal round and reactive to light [EOMI] : extraocular movements intact [Normal Outer Ear/Nose] : the outer ears and nose were normal in appearance [Normal Oropharynx] : the oropharynx was normal [No JVD] : no jugular venous distention [No Lymphadenopathy] : no lymphadenopathy [Supple] : supple [Thyroid Normal, No Nodules] : the thyroid was normal and there were no nodules present [No Respiratory Distress] : no respiratory distress  [No Accessory Muscle Use] : no accessory muscle use [Clear to Auscultation] : lungs were clear to auscultation bilaterally [Normal Rate] : normal rate  [Regular Rhythm] : with a regular rhythm [Normal S1, S2] : normal S1 and S2 [No Carotid Bruits] : no carotid bruits [No Murmur] : no murmur heard [No Abdominal Bruit] : a ~M bruit was not heard ~T in the abdomen [No Varicosities] : no varicosities [Pedal Pulses Present] : the pedal pulses are present [No Palpable Aorta] : no palpable aorta [No Edema] : there was no peripheral edema [No Extremity Clubbing/Cyanosis] : no extremity clubbing/cyanosis [Soft] : abdomen soft [Non Tender] : non-tender [Non-distended] : non-distended [No Masses] : no abdominal mass palpated [No HSM] : no HSM [Normal Bowel Sounds] : normal bowel sounds [Normal Posterior Cervical Nodes] : no posterior cervical lymphadenopathy [No CVA Tenderness] : no CVA  tenderness [Normal Anterior Cervical Nodes] : no anterior cervical lymphadenopathy [No Spinal Tenderness] : no spinal tenderness [No Joint Swelling] : no joint swelling [Grossly Normal Strength/Tone] : grossly normal strength/tone [No Rash] : no rash [Coordination Grossly Intact] : coordination grossly intact [Normal Gait] : normal gait [No Focal Deficits] : no focal deficits [Deep Tendon Reflexes (DTR)] : deep tendon reflexes were 2+ and symmetric [Speech Grossly Normal] : speech grossly normal [Memory Grossly Normal] : memory grossly normal [Normal Mood] : the mood was normal [Normal Affect] : the affect was normal [Normal Insight/Judgement] : insight and judgment were intact [Comprehensive Foot Exam Normal] : Right and left foot were examined and both feet are normal. No ulcers in either foot. Toes are normal and with full ROM.  Normal tactile sensation with monofilament testing throughout both feet [de-identified] : Hearing aid right ear [de-identified] : unsteady at times

## 2020-05-21 LAB
ALBUMIN SERPL ELPH-MCNC: 4.4 G/DL
ALP BLD-CCNC: 46 U/L
ALT SERPL-CCNC: 23 U/L
ANION GAP SERPL CALC-SCNC: 11 MMOL/L
APPEARANCE: CLEAR
AST SERPL-CCNC: 32 U/L
BACTERIA: NEGATIVE
BASOPHILS # BLD AUTO: 0.05 K/UL
BASOPHILS NFR BLD AUTO: 0.7 %
BILIRUB SERPL-MCNC: 0.6 MG/DL
BILIRUBIN URINE: NEGATIVE
BLOOD URINE: NEGATIVE
BUN SERPL-MCNC: 18 MG/DL
CALCIUM SERPL-MCNC: 9.6 MG/DL
CHLORIDE SERPL-SCNC: 103 MMOL/L
CHOLEST SERPL-MCNC: 155 MG/DL
CHOLEST/HDLC SERPL: 4.1 RATIO
CO2 SERPL-SCNC: 26 MMOL/L
COLOR: YELLOW
CREAT SERPL-MCNC: 1.39 MG/DL
EOSINOPHIL # BLD AUTO: 0.62 K/UL
EOSINOPHIL NFR BLD AUTO: 8.2 %
ESTIMATED AVERAGE GLUCOSE: 126 MG/DL
GLUCOSE QUALITATIVE U: NEGATIVE
GLUCOSE SERPL-MCNC: 80 MG/DL
HBA1C MFR BLD HPLC: 6 %
HCT VFR BLD CALC: 50.7 %
HDLC SERPL-MCNC: 37 MG/DL
HGB BLD-MCNC: 16.1 G/DL
HYALINE CASTS: 0 /LPF
IMM GRANULOCYTES NFR BLD AUTO: 0.4 %
KETONES URINE: NEGATIVE
LDLC SERPL CALC-MCNC: 79 MG/DL
LEUKOCYTE ESTERASE URINE: NEGATIVE
LYMPHOCYTES # BLD AUTO: 2.13 K/UL
LYMPHOCYTES NFR BLD AUTO: 28.1 %
MAN DIFF?: NORMAL
MCHC RBC-ENTMCNC: 28.9 PG
MCHC RBC-ENTMCNC: 31.8 GM/DL
MCV RBC AUTO: 90.9 FL
MICROSCOPIC-UA: NORMAL
MONOCYTES # BLD AUTO: 0.61 K/UL
MONOCYTES NFR BLD AUTO: 8.1 %
NEUTROPHILS # BLD AUTO: 4.13 K/UL
NEUTROPHILS NFR BLD AUTO: 54.5 %
NITRITE URINE: NEGATIVE
PH URINE: 6
PLATELET # BLD AUTO: 213 K/UL
POTASSIUM SERPL-SCNC: 4.3 MMOL/L
PROT SERPL-MCNC: 7 G/DL
PROTEIN URINE: NEGATIVE
RBC # BLD: 5.58 M/UL
RBC # FLD: 12.8 %
RED BLOOD CELLS URINE: 0 /HPF
SODIUM SERPL-SCNC: 140 MMOL/L
SPECIFIC GRAVITY URINE: 1.01
SQUAMOUS EPITHELIAL CELLS: 0 /HPF
T4 FREE SERPL-MCNC: 1.5 NG/DL
TRIGL SERPL-MCNC: 191 MG/DL
TSH SERPL-ACNC: 1.98 UIU/ML
UROBILINOGEN URINE: NORMAL
WBC # FLD AUTO: 7.57 K/UL
WHITE BLOOD CELLS URINE: 0 /HPF

## 2020-05-22 LAB
CREAT SPEC-SCNC: 92 MG/DL
MICROALBUMIN 24H UR DL<=1MG/L-MCNC: <1.2 MG/DL
MICROALBUMIN/CREAT 24H UR-RTO: NORMAL MG/G

## 2020-07-11 ENCOUNTER — RX RENEWAL (OUTPATIENT)
Age: 85
End: 2020-07-11

## 2020-07-11 RX ORDER — GLIMEPIRIDE 2 MG/1
2 TABLET ORAL
Qty: 90 | Refills: 1 | Status: ACTIVE | COMMUNITY
Start: 2018-05-17 | End: 1900-01-01

## 2020-07-22 ENCOUNTER — APPOINTMENT (OUTPATIENT)
Dept: FAMILY MEDICINE | Facility: CLINIC | Age: 85
End: 2020-07-22

## 2020-12-16 PROBLEM — N39.0 ACUTE UTI: Status: RESOLVED | Noted: 2018-07-09 | Resolved: 2020-12-16

## 2024-10-18 NOTE — ED ADULT NURSE NOTE - CAS EDN DISCHARGE ASSESSMENT
POST-OP DIAGNOSIS:  Acute medial meniscal tear, left, initial encounter 18-Oct-2024 14:05:18  Gage Mccray   Alert and oriented to person, place and time